# Patient Record
Sex: FEMALE | Race: WHITE | NOT HISPANIC OR LATINO
[De-identification: names, ages, dates, MRNs, and addresses within clinical notes are randomized per-mention and may not be internally consistent; named-entity substitution may affect disease eponyms.]

---

## 2017-01-31 ENCOUNTER — APPOINTMENT (OUTPATIENT)
Dept: THORACIC SURGERY | Facility: CLINIC | Age: 52
End: 2017-01-31

## 2017-01-31 VITALS
RESPIRATION RATE: 17 BRPM | BODY MASS INDEX: 50.02 KG/M2 | SYSTOLIC BLOOD PRESSURE: 127 MMHG | DIASTOLIC BLOOD PRESSURE: 61 MMHG | TEMPERATURE: 97.3 F | OXYGEN SATURATION: 95 % | HEART RATE: 57 BPM | WEIGHT: 293 LBS | HEIGHT: 64 IN

## 2017-01-31 DIAGNOSIS — E03.9 HYPOTHYROIDISM, UNSPECIFIED: ICD-10-CM

## 2017-01-31 DIAGNOSIS — Z78.9 OTHER SPECIFIED HEALTH STATUS: ICD-10-CM

## 2017-01-31 DIAGNOSIS — Z98.84 BARIATRIC SURGERY STATUS: ICD-10-CM

## 2017-01-31 DIAGNOSIS — K44.9 DIAPHRAGMATIC HERNIA W/OUT OBSTRUCTION OR GANGRENE: ICD-10-CM

## 2017-01-31 DIAGNOSIS — I10 ESSENTIAL (PRIMARY) HYPERTENSION: ICD-10-CM

## 2017-01-31 DIAGNOSIS — E28.2 POLYCYSTIC OVARIAN SYNDROME: ICD-10-CM

## 2017-01-31 DIAGNOSIS — F17.200 NICOTINE DEPENDENCE, UNSPECIFIED, UNCOMPLICATED: ICD-10-CM

## 2017-01-31 RX ORDER — LEVOTHYROXINE SODIUM 0.15 MG/1
150 TABLET ORAL
Refills: 0 | Status: ACTIVE | COMMUNITY

## 2017-01-31 RX ORDER — SIMVASTATIN 20 MG/1
20 TABLET, FILM COATED ORAL
Refills: 0 | Status: ACTIVE | COMMUNITY

## 2017-01-31 RX ORDER — PANTOPRAZOLE SODIUM 40 MG/1
40 TABLET, DELAYED RELEASE ORAL
Refills: 0 | Status: ACTIVE | COMMUNITY

## 2017-01-31 RX ORDER — FLUOXETINE HYDROCHLORIDE 90 MG/1
90 CAPSULE, DELAYED RELEASE PELLETS ORAL
Refills: 0 | Status: ACTIVE | COMMUNITY

## 2017-01-31 RX ORDER — METOPROLOL TARTRATE 25 MG/1
25 TABLET, FILM COATED ORAL
Refills: 0 | Status: ACTIVE | COMMUNITY

## 2017-01-31 RX ORDER — CELECOXIB 200 MG/1
200 CAPSULE ORAL
Refills: 0 | Status: ACTIVE | COMMUNITY

## 2017-01-31 RX ORDER — SUCRALFATE 1 G/10ML
1 SUSPENSION ORAL
Refills: 0 | Status: ACTIVE | COMMUNITY

## 2017-02-20 ENCOUNTER — OUTPATIENT (OUTPATIENT)
Dept: OUTPATIENT SERVICES | Facility: HOSPITAL | Age: 52
LOS: 1 days | Discharge: HOME | End: 2017-02-20

## 2017-03-02 ENCOUNTER — APPOINTMENT (OUTPATIENT)
Dept: ORTHOPEDIC SURGERY | Facility: CLINIC | Age: 52
End: 2017-03-02

## 2017-06-27 DIAGNOSIS — Z12.31 ENCOUNTER FOR SCREENING MAMMOGRAM FOR MALIGNANT NEOPLASM OF BREAST: ICD-10-CM

## 2018-03-20 ENCOUNTER — OUTPATIENT (OUTPATIENT)
Dept: OUTPATIENT SERVICES | Facility: HOSPITAL | Age: 53
LOS: 1 days | Discharge: HOME | End: 2018-03-20

## 2018-03-20 DIAGNOSIS — Z12.31 ENCOUNTER FOR SCREENING MAMMOGRAM FOR MALIGNANT NEOPLASM OF BREAST: ICD-10-CM

## 2018-11-25 ENCOUNTER — TRANSCRIPTION ENCOUNTER (OUTPATIENT)
Age: 53
End: 2018-11-25

## 2019-04-01 PROBLEM — E28.2 POLYCYSTIC OVARY SYNDROME: Status: ACTIVE | Noted: 2017-01-31

## 2019-08-19 ENCOUNTER — OUTPATIENT (OUTPATIENT)
Dept: OUTPATIENT SERVICES | Facility: HOSPITAL | Age: 54
LOS: 1 days | Discharge: HOME | End: 2019-08-19
Payer: MEDICARE

## 2019-08-19 DIAGNOSIS — Z12.31 ENCOUNTER FOR SCREENING MAMMOGRAM FOR MALIGNANT NEOPLASM OF BREAST: ICD-10-CM

## 2019-08-19 PROCEDURE — 77063 BREAST TOMOSYNTHESIS BI: CPT | Mod: 26

## 2019-08-19 PROCEDURE — 77067 SCR MAMMO BI INCL CAD: CPT | Mod: 26

## 2019-08-26 ENCOUNTER — OUTPATIENT (OUTPATIENT)
Dept: OUTPATIENT SERVICES | Facility: HOSPITAL | Age: 54
LOS: 1 days | Discharge: HOME | End: 2019-08-26
Payer: MEDICARE

## 2019-08-26 DIAGNOSIS — R92.8 OTHER ABNORMAL AND INCONCLUSIVE FINDINGS ON DIAGNOSTIC IMAGING OF BREAST: ICD-10-CM

## 2019-08-26 PROCEDURE — 77065 DX MAMMO INCL CAD UNI: CPT | Mod: 26,RT

## 2019-08-26 PROCEDURE — G0279: CPT | Mod: 26,RT

## 2019-08-26 PROCEDURE — 76642 ULTRASOUND BREAST LIMITED: CPT | Mod: 26,RT

## 2021-06-28 ENCOUNTER — OUTPATIENT (OUTPATIENT)
Dept: OUTPATIENT SERVICES | Facility: HOSPITAL | Age: 56
LOS: 1 days | Discharge: HOME | End: 2021-06-28
Payer: MEDICARE

## 2021-06-28 ENCOUNTER — RESULT REVIEW (OUTPATIENT)
Age: 56
End: 2021-06-28

## 2021-06-28 DIAGNOSIS — M17.0 BILATERAL PRIMARY OSTEOARTHRITIS OF KNEE: ICD-10-CM

## 2021-06-28 PROCEDURE — 73522 X-RAY EXAM HIPS BI 3-4 VIEWS: CPT | Mod: 26

## 2021-06-28 PROCEDURE — 73564 X-RAY EXAM KNEE 4 OR MORE: CPT | Mod: 26,50

## 2022-06-15 ENCOUNTER — RESULT REVIEW (OUTPATIENT)
Age: 57
End: 2022-06-15

## 2022-06-15 ENCOUNTER — OUTPATIENT (OUTPATIENT)
Dept: OUTPATIENT SERVICES | Facility: HOSPITAL | Age: 57
LOS: 1 days | Discharge: HOME | End: 2022-06-15
Payer: MEDICARE

## 2022-06-15 DIAGNOSIS — M17.0 BILATERAL PRIMARY OSTEOARTHRITIS OF KNEE: ICD-10-CM

## 2022-06-15 PROCEDURE — 73562 X-RAY EXAM OF KNEE 3: CPT | Mod: 26,50

## 2022-09-08 ENCOUNTER — RESULT REVIEW (OUTPATIENT)
Age: 57
End: 2022-09-08

## 2022-09-08 ENCOUNTER — OUTPATIENT (OUTPATIENT)
Dept: OUTPATIENT SERVICES | Facility: HOSPITAL | Age: 57
LOS: 1 days | Discharge: HOME | End: 2022-09-08

## 2022-09-08 DIAGNOSIS — M19.072 PRIMARY OSTEOARTHRITIS, LEFT ANKLE AND FOOT: ICD-10-CM

## 2022-09-08 PROCEDURE — 73610 X-RAY EXAM OF ANKLE: CPT | Mod: 26,50

## 2023-12-19 ENCOUNTER — APPOINTMENT (OUTPATIENT)
Dept: PLASTIC SURGERY | Facility: CLINIC | Age: 58
End: 2023-12-19
Payer: MEDICARE

## 2023-12-19 VITALS — WEIGHT: 197 LBS | BODY MASS INDEX: 34.91 KG/M2 | HEIGHT: 63 IN

## 2023-12-19 DIAGNOSIS — Z87.891 PERSONAL HISTORY OF NICOTINE DEPENDENCE: ICD-10-CM

## 2023-12-19 DIAGNOSIS — Z87.42 PERSONAL HISTORY OF OTHER DISEASES OF THE FEMALE GENITAL TRACT: ICD-10-CM

## 2023-12-19 DIAGNOSIS — Z87.19 PERSONAL HISTORY OF OTHER DISEASES OF THE DIGESTIVE SYSTEM: ICD-10-CM

## 2023-12-19 DIAGNOSIS — F41.9 ANXIETY DISORDER, UNSPECIFIED: ICD-10-CM

## 2023-12-19 DIAGNOSIS — Z86.39 PERSONAL HISTORY OF OTHER ENDOCRINE, NUTRITIONAL AND METABOLIC DISEASE: ICD-10-CM

## 2023-12-19 DIAGNOSIS — G89.29 DORSALGIA, UNSPECIFIED: ICD-10-CM

## 2023-12-19 DIAGNOSIS — Z87.39 PERSONAL HISTORY OF OTHER DISEASES OF THE MUSCULOSKELETAL SYSTEM AND CONNECTIVE TISSUE: ICD-10-CM

## 2023-12-19 DIAGNOSIS — M54.9 DORSALGIA, UNSPECIFIED: ICD-10-CM

## 2023-12-19 DIAGNOSIS — K42.9 UMBILICAL HERNIA W/OUT OBSTRUCTION OR GANGRENE: ICD-10-CM

## 2023-12-19 DIAGNOSIS — F32.A ANXIETY DISORDER, UNSPECIFIED: ICD-10-CM

## 2023-12-19 DIAGNOSIS — Z86.79 PERSONAL HISTORY OF OTHER DISEASES OF THE CIRCULATORY SYSTEM: ICD-10-CM

## 2023-12-19 PROCEDURE — 99203 OFFICE O/P NEW LOW 30 MIN: CPT

## 2023-12-19 RX ORDER — CLOBETASOL PROPIONATE 0.5 MG/G
CREAM TOPICAL
Refills: 0 | Status: ACTIVE | COMMUNITY

## 2023-12-19 RX ORDER — ACETAMINOPHEN/DIPHENHYDRAMINE 500MG-25MG
1000 TABLET ORAL
Refills: 0 | Status: ACTIVE | COMMUNITY

## 2023-12-19 RX ORDER — MULTIVIT-MIN/IRON/FOLIC ACID/K 18-600-40
CAPSULE ORAL
Refills: 0 | Status: ACTIVE | COMMUNITY

## 2023-12-19 RX ORDER — MULTIVITAMIN
TABLET ORAL
Refills: 0 | Status: ACTIVE | COMMUNITY

## 2023-12-19 RX ORDER — MECLIZINE HYDROCHLORIDE 25 MG/1
TABLET ORAL
Refills: 0 | Status: ACTIVE | COMMUNITY

## 2023-12-19 NOTE — PHYSICAL EXAM
[de-identified] : Well developed, overweight female in NAD [de-identified] : ATNC [de-identified] : Supple [de-identified] : Non labored on RA [de-identified] : LORIR [de-identified] : Bilateral breasts soft, with significant Grade 4 ptosis.  BSA 1.92, pt will require an approx 530gm reduction bilaterally [de-identified] : Abd is soft, non-tender with questionable palpable umbilical hernia. Large, grade 4 overhanging abdominal pannus with erythematous under pannus  irritation and significant moisture.  [de-identified] : FROM

## 2023-12-19 NOTE — DATA REVIEWED
[FreeTextEntry1] : Most recent mammogram along with all supporting documents have been uploaded and reviewed

## 2023-12-19 NOTE — ASSESSMENT
[FreeTextEntry1] : Patient is a good candidate for a panniculectomy.  Regarding the procedure, we discussed the risks of poor wound healing, seroma formation, infection, bleeding, keloid/hypertrophic scarring, dissatisfaction with the outcome, need for readmission, venous thromboembolic complications with possible pulmonary embolism.  We discussed the need for postop use of an abdominal binder and the limited activity after surgery. All the patient's questions were answered and all risks were well understood by the patient.  Would need emmanuel castaneda  Patient is a good candidate for breast reduction.  Regarding the procedure, we discussed scarring, poor wound healing, keloid and/or hypertrophic scarring, diminished nipple sensation, diminished ability to breast feed (if appropriate), breast asymmetry, dissatisfaction with the outcome, need for additional surgery and possible nipple loss.  All questions were answered and all risks were understood.  Would need free nipple graft  Photos taken with patient permission. Abd CT  pt wishes to have abdoment addressed first

## 2023-12-19 NOTE — REVIEW OF SYSTEMS
[As Noted in HPI] : as noted in HPI [Difficulty Walking] : difficulty walking [Anxiety] : anxiety [Depression] : depression [Negative] : Heme/Lymph [Chest Pain] : no chest pain [Shortness Of Breath] : no shortness of breath [Abdominal Pain] : no abdominal pain [Vomiting] : no vomiting

## 2023-12-19 NOTE — HISTORY OF PRESENT ILLNESS
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 Patient reports history of Lap Band in 2009 with Dr. Adam in [facility], s/p band revision & hernia repair (no mesh) in 2017 and then conversion to gastric bypass in March 2022 with . Pt states nausea/vomiting resolved. Pt still follows up with  office, was started on Ozempic 1 year ago which prompted an additional 60lbs. Pt with overall 150lbs weight loss (348lbs-->196lbs). Weight stable within 5-10lbs for approximately 3 months, BMI 34.9. Pt had nutritional labs done recently, does not have a copy, unsure of any deficiencies. Pt endorses h/o severe rashes and irritation under both her breast and her abd pannus, pt has used anti-fungal powder and OTC ointments for over 6 months without improvement. Pt has severe bra strap grooving as well.  Overhanging abdominal pannus affects low self esteem and daily interactions. Pt states that she has tremendous difficulty walking, to the point she has felt bed bound at times.  States her pannnus has affected her ability to get in and out of the shower, making cleanliness difficult. Patient reports all over back pain secondary to weight of pannus, pt received her 1st spinal shot by  (in [place]) last week, without any relief yet. Pt also went to physical therapy for over 6 months without any relief.   Pt was on high dose pain medication (fentanyl, oxycodone, more)  however  stopped all of them and plans on prescribing 1 narcotic to decrease r/o dependency.  Pt is due for a mammo, her last one 1 year ago was WNL. Denies any personal or family h/o breast cancer.   Desires surgical correction for both her breasts and abdominal pannus.  No other pertinent medical or surgical history.   Pt has supporting letters from multiple doctors recommended panniculectomy/ breast reduction surgery. Ortho states pain is NOT 2/2 spinal issues, derm noted h/o chronic rashes since 2017, etc.  Current bra size: 42F Desires size: as small as possible   Former smoker, quit 10 years ago. Smokes sometimes,  last cigarette was 5M ago.  Occ: On disability x10 years for chronic pain issues  Previously saw  however very unhappy

## 2023-12-22 ENCOUNTER — NON-APPOINTMENT (OUTPATIENT)
Age: 58
End: 2023-12-22

## 2023-12-24 ENCOUNTER — OUTPATIENT (OUTPATIENT)
Dept: OUTPATIENT SERVICES | Facility: HOSPITAL | Age: 58
LOS: 1 days | End: 2023-12-24
Payer: MEDICARE

## 2023-12-24 ENCOUNTER — RESULT REVIEW (OUTPATIENT)
Age: 58
End: 2023-12-24

## 2023-12-24 DIAGNOSIS — Z00.8 ENCOUNTER FOR OTHER GENERAL EXAMINATION: ICD-10-CM

## 2023-12-24 DIAGNOSIS — K42.9 UMBILICAL HERNIA WITHOUT OBSTRUCTION OR GANGRENE: ICD-10-CM

## 2023-12-24 PROCEDURE — 74176 CT ABD & PELVIS W/O CONTRAST: CPT | Mod: 26,MH

## 2023-12-24 PROCEDURE — 74176 CT ABD & PELVIS W/O CONTRAST: CPT

## 2023-12-25 DIAGNOSIS — K42.9 UMBILICAL HERNIA WITHOUT OBSTRUCTION OR GANGRENE: ICD-10-CM

## 2024-01-30 ENCOUNTER — APPOINTMENT (OUTPATIENT)
Dept: PLASTIC SURGERY | Facility: CLINIC | Age: 59
End: 2024-01-30
Payer: MEDICARE

## 2024-01-30 ENCOUNTER — TRANSCRIPTION ENCOUNTER (OUTPATIENT)
Age: 59
End: 2024-01-30

## 2024-01-30 PROCEDURE — 99212 OFFICE O/P EST SF 10 MIN: CPT

## 2024-01-30 NOTE — ASSESSMENT
[FreeTextEntry1] : Patient is a good candidate for a panniculectomy.  Regarding the procedure, we discussed the risks of poor wound healing, seroma formation, infection, bleeding, keloid/hypertrophic scarring, dissatisfaction with the outcome, need for readmission, venous thromboembolic complications with possible pulmonary embolism.  We discussed the need for postop use of an abdominal binder and the limited activity after surgery. All the patient's questions were answered and all risks were well understood by the patient.  Would need emmanuel leon panskye  Patient is a good candidate for breast reduction.  Regarding the procedure, we discussed scarring, poor wound healing, keloid and/or hypertrophic scarring, diminished nipple sensation, diminished ability to breast feed (if appropriate), breast asymmetry, dissatisfaction with the outcome, need for additional surgery and possible nipple loss.  All questions were answered and all risks were understood.  Would need free nipple graft  Photos taken with patient permission. Abd CT  pt wishes to have abdoment addressed first  1/30/2024 as above CT reviewed  preop ?s reviewed in detail  reasonmable results discussed  Patient is a good candidate for a panniculectomy.  Regarding the procedure, we discussed the risks of poor wound healing, seroma formation, infection, bleeding, keloid/hypertrophic scarring, dissatisfaction with the outcome, need for readmission, venous thromboembolic complications with possible pulmonary embolism.  We discussed the need for postop use of an abdominal binder and the limited activity after surgery. All the patient's questions were answered and all risks were well understood by the patient.  CT reviewed  OR Feb 26  pt happy w plan  discussed risk odf umbliical loss

## 2024-01-30 NOTE — REVIEW OF SYSTEMS
[Chest Pain] : no chest pain [Shortness Of Breath] : no shortness of breath [Abdominal Pain] : no abdominal pain [Vomiting] : no vomiting [As Noted in HPI] : as noted in HPI [Difficulty Walking] : difficulty walking [Anxiety] : anxiety [Depression] : depression [Negative] : Heme/Lymph

## 2024-01-30 NOTE — HISTORY OF PRESENT ILLNESS
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 Patient reports history of Lap Band in 2009 with Dr. Adam in [facility], s/p band revision & hernia repair (no mesh) in 2017 and then conversion to gastric bypass in March 2022 with . Pt states nausea/vomiting resolved. Pt still follows up with  office, was started on Ozempic 1 year ago which prompted an additional 60lbs. Pt with overall 150lbs weight loss (348lbs-->196lbs). Weight stable within 5-10lbs for approximately 3 months, BMI 34.9. Pt had nutritional labs done recently, does not have a copy, unsure of any deficiencies. Pt endorses h/o severe rashes and irritation under both her breast and her abd pannus, pt has used anti-fungal powder and OTC ointments for over 6 months without improvement. Pt has severe bra strap grooving as well.  Overhanging abdominal pannus affects low self esteem and daily interactions. Pt states that she has tremendous difficulty walking, to the point she has felt bed bound at times.  States her pannnus has affected her ability to get in and out of the shower, making cleanliness difficult. Patient reports all over back pain secondary to weight of pannus, pt received her 1st spinal shot by  (in [place]) last week, without any relief yet. Pt also went to physical therapy for over 6 months without any relief.   Pt was on high dose pain medication (fentanyl, oxycodone, more)  however  stopped all of them and plans on prescribing 1 narcotic to decrease r/o dependency.  Pt is due for a mammo, her last one 1 year ago was WNL. Denies any personal or family h/o breast cancer.   Desires surgical correction for both her breasts and abdominal pannus.  No other pertinent medical or surgical history.   Pt has supporting letters from multiple doctors recommended panniculectomy/ breast reduction surgery. Ortho states pain is NOT 2/2 spinal issues, derm noted h/o chronic rashes since 2017, etc.  Current bra size: 42F Desires size: as small as possible   Former smoker, quit 10 years ago. Smokes sometimes,  last cigarette was 5M ago.  Occ: On disability x10 years for chronic pain issues  Previously saw  however very unhappy

## 2024-01-30 NOTE — PHYSICAL EXAM
[de-identified] : Well developed, overweight female in NAD [de-identified] : ATNC [de-identified] : Supple [de-identified] : Non labored on RA [de-identified] : LORIR [de-identified] : Bilateral breasts soft, with significant Grade 4 ptosis.  BSA 1.92, pt will require an approx 530gm reduction bilaterally [de-identified] : Abd is soft, non-tender with questionable palpable umbilical hernia. Large, grade 4 overhanging abdominal pannus with erythematous under pannus  irritation and significant moisture.  [de-identified] : FROM

## 2024-02-09 DIAGNOSIS — G89.18 OTHER ACUTE POSTPROCEDURAL PAIN: ICD-10-CM

## 2024-02-12 ENCOUNTER — OUTPATIENT (OUTPATIENT)
Dept: OUTPATIENT SERVICES | Facility: HOSPITAL | Age: 59
LOS: 1 days | End: 2024-02-12
Payer: MEDICARE

## 2024-02-12 VITALS
SYSTOLIC BLOOD PRESSURE: 137 MMHG | HEART RATE: 61 BPM | RESPIRATION RATE: 16 BRPM | DIASTOLIC BLOOD PRESSURE: 73 MMHG | WEIGHT: 192.02 LBS | TEMPERATURE: 98 F | HEIGHT: 62 IN | OXYGEN SATURATION: 99 %

## 2024-02-12 DIAGNOSIS — Z98.891 HISTORY OF UTERINE SCAR FROM PREVIOUS SURGERY: Chronic | ICD-10-CM

## 2024-02-12 DIAGNOSIS — Z98.890 OTHER SPECIFIED POSTPROCEDURAL STATES: Chronic | ICD-10-CM

## 2024-02-12 DIAGNOSIS — Z90.89 ACQUIRED ABSENCE OF OTHER ORGANS: Chronic | ICD-10-CM

## 2024-02-12 DIAGNOSIS — Z87.19 PERSONAL HISTORY OF OTHER DISEASES OF THE DIGESTIVE SYSTEM: Chronic | ICD-10-CM

## 2024-02-12 DIAGNOSIS — L98.7 EXCESSIVE AND REDUNDANT SKIN AND SUBCUTANEOUS TISSUE: ICD-10-CM

## 2024-02-12 DIAGNOSIS — Z86.018 PERSONAL HISTORY OF OTHER BENIGN NEOPLASM: Chronic | ICD-10-CM

## 2024-02-12 DIAGNOSIS — Z01.818 ENCOUNTER FOR OTHER PREPROCEDURAL EXAMINATION: ICD-10-CM

## 2024-02-12 LAB
ALBUMIN SERPL ELPH-MCNC: 3.9 G/DL — SIGNIFICANT CHANGE UP (ref 3.5–5.2)
ALP SERPL-CCNC: 111 U/L — SIGNIFICANT CHANGE UP (ref 30–115)
ALT FLD-CCNC: 13 U/L — SIGNIFICANT CHANGE UP (ref 0–41)
ANION GAP SERPL CALC-SCNC: 8 MMOL/L — SIGNIFICANT CHANGE UP (ref 7–14)
AST SERPL-CCNC: 14 U/L — SIGNIFICANT CHANGE UP (ref 0–41)
BASOPHILS # BLD AUTO: 0.04 K/UL — SIGNIFICANT CHANGE UP (ref 0–0.2)
BASOPHILS NFR BLD AUTO: 1.2 % — HIGH (ref 0–1)
BILIRUB SERPL-MCNC: 0.4 MG/DL — SIGNIFICANT CHANGE UP (ref 0.2–1.2)
BUN SERPL-MCNC: 15 MG/DL — SIGNIFICANT CHANGE UP (ref 10–20)
CALCIUM SERPL-MCNC: 8.8 MG/DL — SIGNIFICANT CHANGE UP (ref 8.4–10.5)
CHLORIDE SERPL-SCNC: 106 MMOL/L — SIGNIFICANT CHANGE UP (ref 98–110)
CO2 SERPL-SCNC: 27 MMOL/L — SIGNIFICANT CHANGE UP (ref 17–32)
CREAT SERPL-MCNC: 0.9 MG/DL — SIGNIFICANT CHANGE UP (ref 0.7–1.5)
EGFR: 73 ML/MIN/1.73M2 — SIGNIFICANT CHANGE UP
EOSINOPHIL # BLD AUTO: 0.1 K/UL — SIGNIFICANT CHANGE UP (ref 0–0.7)
EOSINOPHIL NFR BLD AUTO: 3.1 % — SIGNIFICANT CHANGE UP (ref 0–8)
GLUCOSE SERPL-MCNC: 72 MG/DL — SIGNIFICANT CHANGE UP (ref 70–99)
HCT VFR BLD CALC: 35.6 % — LOW (ref 37–47)
HGB BLD-MCNC: 12 G/DL — SIGNIFICANT CHANGE UP (ref 12–16)
IMM GRANULOCYTES NFR BLD AUTO: 0.3 % — SIGNIFICANT CHANGE UP (ref 0.1–0.3)
LYMPHOCYTES # BLD AUTO: 1.19 K/UL — LOW (ref 1.2–3.4)
LYMPHOCYTES # BLD AUTO: 37 % — SIGNIFICANT CHANGE UP (ref 20.5–51.1)
MCHC RBC-ENTMCNC: 29.8 PG — SIGNIFICANT CHANGE UP (ref 27–31)
MCHC RBC-ENTMCNC: 33.7 G/DL — SIGNIFICANT CHANGE UP (ref 32–37)
MCV RBC AUTO: 88.3 FL — SIGNIFICANT CHANGE UP (ref 81–99)
MONOCYTES # BLD AUTO: 0.26 K/UL — SIGNIFICANT CHANGE UP (ref 0.1–0.6)
MONOCYTES NFR BLD AUTO: 8.1 % — SIGNIFICANT CHANGE UP (ref 1.7–9.3)
NEUTROPHILS # BLD AUTO: 1.62 K/UL — SIGNIFICANT CHANGE UP (ref 1.4–6.5)
NEUTROPHILS NFR BLD AUTO: 50.3 % — SIGNIFICANT CHANGE UP (ref 42.2–75.2)
NRBC # BLD: 0 /100 WBCS — SIGNIFICANT CHANGE UP (ref 0–0)
PLATELET # BLD AUTO: 226 K/UL — SIGNIFICANT CHANGE UP (ref 130–400)
PMV BLD: 9.9 FL — SIGNIFICANT CHANGE UP (ref 7.4–10.4)
POTASSIUM SERPL-MCNC: 4.2 MMOL/L — SIGNIFICANT CHANGE UP (ref 3.5–5)
POTASSIUM SERPL-SCNC: 4.2 MMOL/L — SIGNIFICANT CHANGE UP (ref 3.5–5)
PROT SERPL-MCNC: 6.2 G/DL — SIGNIFICANT CHANGE UP (ref 6–8)
RBC # BLD: 4.03 M/UL — LOW (ref 4.2–5.4)
RBC # FLD: 12.9 % — SIGNIFICANT CHANGE UP (ref 11.5–14.5)
SODIUM SERPL-SCNC: 141 MMOL/L — SIGNIFICANT CHANGE UP (ref 135–146)
WBC # BLD: 3.22 K/UL — LOW (ref 4.8–10.8)
WBC # FLD AUTO: 3.22 K/UL — LOW (ref 4.8–10.8)

## 2024-02-12 PROCEDURE — 93005 ELECTROCARDIOGRAM TRACING: CPT

## 2024-02-12 PROCEDURE — 99214 OFFICE O/P EST MOD 30 MIN: CPT | Mod: 25

## 2024-02-12 PROCEDURE — 93010 ELECTROCARDIOGRAM REPORT: CPT

## 2024-02-12 PROCEDURE — 85025 COMPLETE CBC W/AUTO DIFF WBC: CPT

## 2024-02-12 PROCEDURE — 80053 COMPREHEN METABOLIC PANEL: CPT

## 2024-02-12 PROCEDURE — 36415 COLL VENOUS BLD VENIPUNCTURE: CPT

## 2024-02-12 NOTE — H&P PST ADULT - NSICDXPASTMEDICALHX_GEN_ALL_CORE_FT
PAST MEDICAL HISTORY:  Anxiety and depression     Chronic fatigue     H/O fibromyalgia     History of seizure disorder     Hypertension     Hypothyroidism

## 2024-02-12 NOTE — H&P PST ADULT - NSICDXFAMILYHX_GEN_ALL_CORE_FT
FAMILY HISTORY:  Father  Still living? Unknown  FHx: early MI, Age at diagnosis: 41-50    Mother  Still living? Unknown  FHx: stomach cancer, Age at diagnosis: Age Unknown

## 2024-02-12 NOTE — H&P PST ADULT - HISTORY OF PRESENT ILLNESS
57 y/o female presents to PAST in preparation for Sury Freire Panniculectomy in [facility] under [place] with Dr. Gilliland on 2/26/24    Pt states that she has lost 140 lbs in the past 2 yrs, with gastric bypass and Ozempic (last use 1 month ago). Pt now with excessive loose abdominal skin, causes rashes, back pain. Pt has tried different creams for rashes with minimal result. Pt now for above procedure.     PATIENT CURRENTLY DENIES CHEST PAIN, PALPITATIONS,  DYSURIA, OR UPPER RESPIRATORY INFECTION IN PAST 2 WEEKS  Patient verbalized understanding of instructions and was given the opportunity to ask questions and have them answered.  As per patient, this is their complete medical and surgical history, including medications both prescribed or over the counter.  written and verbal instructions with teach back on chlorhexidine shampoo provided,  pt verbalized understanding with returned demonstration    Anesthesia Alert  NO--Difficult Airway  NO--History of neck surgery or radiation  NO--Limited ROM of neck  NO--History of Malignant hyperthermia  NO--Personal or family history of Pseudocholinesterase deficiency.  NO--Prior Anesthesia Complication  NO--Latex Allergy  NO--Loose teeth  NO--History of Rheumatoid Arthritis  NO--VY-had prior to weight loss   NO--Bleeding risk  NO--Other_____  Mallampati airway: Class II  Hx of Seizure- last sx many yrs ago     [facility] Activity Status Index (DASI) from [employer]  on 2/12/2024      RESULT SUMMARY:  18.95 points  The higher the score (maximum 58.2), the higher the functional status.    5.07 METs        INPUTS:  Take care of self —> 2.75 = Yes  Walk indoors —> 1.75 = Yes  Walk 1&ndash;2 blocks on level ground —> 2.75 = Yes  Climb a flight of stairs or walk up a hill —> 5.5 = Yes  Run a short distance —> 0 = No  Do light work around the house —> 2.7 = Yes  Do moderate work around the house —> 3.5 = Yes  Do heavy work around the house —> 0 = No  Do yardwork —> 0 = No  Have sexual relations —> 0 = No  Participate in moderate recreational activities —> 0 = No  Participate in strenuous sports —> 0 = No    Revised Cardiac Risk Index for Pre-Operative Risk from [employer]  on 2/12/2024      RESULT SUMMARY:  0 points  Class I Risk    3.9 %  30-day risk of death, MI, or cardiac arrest    INPUTS:  Elevated-risk surgery —> 0 = No  History of ischemic heart disease —> 0 = No  History of congestive heart failure —> 0 = No  History of cerebrovascular disease —> 0 = No  Pre-operative treatment with insulin —> 0 = No  Pre-operative creatinine >2 mg/dL / 176.8 µmol/L —> 0 = No      Excessive or redundant skin or subcutaneous tissue    Encounter for other preprocedural examination    [id number]; [id number]; [id number]    [email or web address]

## 2024-02-12 NOTE — H&P PST ADULT - NSICDXPASTSURGICALHX_GEN_ALL_CORE_FT
PAST SURGICAL HISTORY:  H/O hiatal hernia     H/O:      History of gastric surgery     History of lipoma     History of tonsillectomy

## 2024-02-12 NOTE — H&P PST ADULT - REASON FOR ADMISSION
59 y/o female presents to PAST in preparation for Sury Freire Panniculectomy in [facility] under [place] with Dr. Gilliland on 2/26/24

## 2024-02-13 DIAGNOSIS — L98.7 EXCESSIVE AND REDUNDANT SKIN AND SUBCUTANEOUS TISSUE: ICD-10-CM

## 2024-02-13 DIAGNOSIS — Z01.818 ENCOUNTER FOR OTHER PREPROCEDURAL EXAMINATION: ICD-10-CM

## 2024-02-15 ENCOUNTER — APPOINTMENT (OUTPATIENT)
Dept: PLASTIC SURGERY | Facility: CLINIC | Age: 59
End: 2024-02-15
Payer: MEDICARE

## 2024-02-15 DIAGNOSIS — Z98.890 NAUSEA WITH VOMITING, UNSPECIFIED: ICD-10-CM

## 2024-02-15 DIAGNOSIS — R11.2 NAUSEA WITH VOMITING, UNSPECIFIED: ICD-10-CM

## 2024-02-15 PROCEDURE — 99212 OFFICE O/P EST SF 10 MIN: CPT

## 2024-02-15 RX ORDER — TRAMADOL HYDROCHLORIDE 50 MG/1
50 TABLET, COATED ORAL EVERY 8 HOURS
Qty: 12 | Refills: 0 | Status: ACTIVE | COMMUNITY
Start: 2024-02-15 | End: 1900-01-01

## 2024-02-15 RX ORDER — CEPHALEXIN 500 MG/1
500 CAPSULE ORAL
Qty: 14 | Refills: 0 | Status: ACTIVE | COMMUNITY
Start: 2024-02-15 | End: 1900-01-01

## 2024-02-15 NOTE — PHYSICAL EXAM
[de-identified] : Abdomen: Abd is soft,. Large, grade 4 overhanging abdominal pannus with erythematous under pannus irritation and significant moisture

## 2024-02-15 NOTE — HISTORY OF PRESENT ILLNESS
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 Patient reports history of Lap Band in 2009 with Dr. Adam in [facility], s/p band revision & hernia repair (no mesh) in 2017 and then conversion to gastric bypass in March 2022 with . Pt states nausea/vomiting resolved. Pt still follows up with  office, was started on Ozempic 1 year ago which prompted an additional 60lbs. Pt with overall 150lbs weight loss (348lbs-->196lbs). Weight stable within 5-10lbs for approximately 3 months, BMI 34.9. Pt had nutritional labs done recently, does not have a copy, unsure of any deficiencies. Pt endorses h/o severe rashes and irritation under both her breast and her abd pannus, pt has used anti-fungal powder and OTC ointments for over 6 months without improvement. Pt has severe bra strap grooving as well. Overhanging abdominal pannus affects low self esteem and daily interactions. Pt states that she has tremendous difficulty walking, to the point she has felt bed bound at times. States her pannnus has affected her ability to get in and out of the shower, making cleanliness difficult. Patient reports all over back pain secondary to weight of pannus, pt received her 1st spinal shot by  (in [place]) last week, without any relief yet. Pt also went to physical therapy for over 6 months without any relief.  Pt was on high dose pain medication (fentanyl, oxycodone, more) however  stopped all of them and plans on prescribing 1 narcotic to decrease r/o dependency. Pt is due for a mammo, her last one 1 year ago was WNL. Denies any personal or family h/o breast cancer.  Desires surgical correction for both her breasts and abdominal pannus. No other pertinent medical or surgical history.  Pt has supporting letters from multiple doctors recommended panniculectomy/ breast reduction surgery. Ortho states pain is NOT 2/2 spinal issues, derm noted h/o chronic rashes since 2017, etc. Current bra size: 42F Desires size: as small as possible  interval hx 2/15/24: here for questions prior to surgery on 2/26, concerned with area of mons  Former smoker, quit 10 years ago. Smokes sometimes, last cigarette was 5M ago. Occ: On disability x10 years for chronic pain issues Previously saw  however very unhappy

## 2024-02-15 NOTE — ASSESSMENT
[FreeTextEntry1] : Patient is a good candidate for a panniculectomy. Regarding the procedure, we discussed the risks of poor wound healing, seroma formation, infection, bleeding, keloid/hypertrophic scarring, dissatisfaction with the outcome, need for readmission, venous thromboembolic complications with possible pulmonary embolism. We discussed the need for postop use of an abdominal binder and the limited activity after surgery. All the patient's questions were answered and all risks were well understood by the patient.  Would need emmanuel di lis pannic  Patient is a good candidate for breast reduction. Regarding the procedure, we discussed scarring, poor wound healing, keloid and/or hypertrophic scarring, diminished nipple sensation, diminished ability to breast feed (if appropriate), breast asymmetry, dissatisfaction with the outcome, need for additional surgery and possible nipple loss. All questions were answered and all risks were understood.  Would need free nipple graft  Photos taken with patient permission. Abd CT  pt wishes to have abdoment addressed first  1/30/2024 as above CT reviewed  preop ?s reviewed in detail  reasonmable results discussed  Patient is a good candidate for a panniculectomy. Regarding the procedure, we discussed the risks of poor wound healing, seroma formation, infection, bleeding, keloid/hypertrophic scarring, dissatisfaction with the outcome, need for readmission, venous thromboembolic complications with possible pulmonary embolism. We discussed the need for postop use of an abdominal binder and the limited activity after surgery. All the patient's questions were answered and all risks were well understood by the patient.  CT reviewed  OR Feb 26 discussed risk odf umbliical loss.   2/15/24 here for questions prior to panic 2/26 all questions reviewed and answered results and expectations discussed    Regarding the procedure, we discussed the risks of poor wound healing, seroma formation, infection, bleeding, keloid/hypertrophic scarring, dissatisfaction with the outcome, need for readmission, venous thromboembolic complications with possible pulmonary embolism. We discussed the need for postop use of an abdominal binder and the limited activity after surgery. All the patient's questions were answered and all risks were well understood by the patient.  list preop ?s discussed  pt showed me various pictures from internet--I advised this can increase preop/postop anxiety as most of pictures did not apply to her  emmanuel-di lis pannic  preop meds given at her request

## 2024-02-22 ENCOUNTER — APPOINTMENT (OUTPATIENT)
Dept: NEUROLOGY | Facility: CLINIC | Age: 59
End: 2024-02-22
Payer: MEDICARE

## 2024-02-22 VITALS
HEART RATE: 59 BPM | WEIGHT: 189 LBS | OXYGEN SATURATION: 98 % | SYSTOLIC BLOOD PRESSURE: 123 MMHG | HEIGHT: 62 IN | BODY MASS INDEX: 34.78 KG/M2 | DIASTOLIC BLOOD PRESSURE: 86 MMHG | RESPIRATION RATE: 16 BRPM

## 2024-02-22 DIAGNOSIS — Z86.39 PERSONAL HISTORY OF OTHER ENDOCRINE, NUTRITIONAL AND METABOLIC DISEASE: ICD-10-CM

## 2024-02-22 DIAGNOSIS — G93.32 MYALGIC ENCEPHALOMYELITIS/CHRONIC FATIGUE SYNDROME: ICD-10-CM

## 2024-02-22 DIAGNOSIS — G40.109 LOCALIZATION-RELATED (FOCAL) (PARTIAL) SYMPTOMATIC EPILEPSY AND EPILEPTIC SYNDROMES WITH SIMPLE PARTIAL SEIZURES, NOT INTRACTABLE, W/OUT STATUS EPILEPTICUS: ICD-10-CM

## 2024-02-22 DIAGNOSIS — G47.30 SLEEP APNEA, UNSPECIFIED: ICD-10-CM

## 2024-02-22 PROBLEM — Z87.39 PERSONAL HISTORY OF OTHER DISEASES OF THE MUSCULOSKELETAL SYSTEM AND CONNECTIVE TISSUE: Chronic | Status: ACTIVE | Noted: 2024-02-12

## 2024-02-22 PROBLEM — E03.9 HYPOTHYROIDISM, UNSPECIFIED: Chronic | Status: ACTIVE | Noted: 2024-02-12

## 2024-02-22 PROBLEM — F41.9 ANXIETY DISORDER, UNSPECIFIED: Chronic | Status: ACTIVE | Noted: 2024-02-12

## 2024-02-22 PROBLEM — R53.82 CHRONIC FATIGUE, UNSPECIFIED: Chronic | Status: ACTIVE | Noted: 2024-02-12

## 2024-02-22 PROCEDURE — 99204 OFFICE O/P NEW MOD 45 MIN: CPT

## 2024-02-22 PROCEDURE — 99214 OFFICE O/P EST MOD 30 MIN: CPT

## 2024-02-22 RX ORDER — RANITIDINE 150 MG/1
150 TABLET ORAL
Refills: 0 | Status: DISCONTINUED | COMMUNITY
End: 2024-02-22

## 2024-02-22 RX ORDER — TOPIRAMATE 200 MG/1
200 TABLET ORAL
Qty: 90 | Refills: 3 | Status: ACTIVE | COMMUNITY
Start: 2024-02-22 | End: 1900-01-01

## 2024-02-22 RX ORDER — LAMOTRIGINE 200 MG/1
200 TABLET ORAL
Qty: 270 | Refills: 3 | Status: ACTIVE | COMMUNITY
Start: 2024-02-22 | End: 1900-01-01

## 2024-02-22 RX ORDER — GABAPENTIN 300 MG/1
300 CAPSULE ORAL
Qty: 7 | Refills: 0 | Status: DISCONTINUED | COMMUNITY
Start: 2024-02-09 | End: 2024-02-22

## 2024-02-22 RX ORDER — SEMAGLUTIDE 1.34 MG/ML
INJECTION, SOLUTION SUBCUTANEOUS
Refills: 0 | Status: DISCONTINUED | COMMUNITY
End: 2024-02-22

## 2024-02-22 RX ORDER — LAMOTRIGINE 100 MG/1
100 TABLET ORAL
Refills: 0 | Status: DISCONTINUED | COMMUNITY
End: 2024-02-22

## 2024-02-22 RX ORDER — ACETAMINOPHEN 325 MG
TABLET ORAL
Refills: 0 | Status: DISCONTINUED | COMMUNITY
End: 2024-02-22

## 2024-02-22 RX ORDER — TOPIRAMATE 100 MG/1
100 TABLET, FILM COATED ORAL
Refills: 0 | Status: DISCONTINUED | COMMUNITY
End: 2024-02-22

## 2024-02-22 RX ORDER — NYSTATIN 100000 [USP'U]/G
POWDER TOPICAL
Refills: 0 | Status: DISCONTINUED | COMMUNITY
End: 2024-02-22

## 2024-02-22 RX ORDER — MULTIVIT-MIN/FOLIC/VIT K/LYCOP 400-300MCG
TABLET ORAL
Refills: 0 | Status: DISCONTINUED | COMMUNITY
End: 2024-02-22

## 2024-02-22 RX ORDER — GABAPENTIN 300 MG/1
300 CAPSULE ORAL
Qty: 7 | Refills: 0 | Status: DISCONTINUED | COMMUNITY
Start: 2024-02-15 | End: 2024-02-22

## 2024-02-22 RX ORDER — ONDANSETRON 8 MG/1
8 TABLET ORAL
Qty: 9 | Refills: 0 | Status: DISCONTINUED | COMMUNITY
Start: 2024-02-15 | End: 2024-02-22

## 2024-02-22 RX ORDER — ASPIRIN 81 MG/1
81 TABLET, CHEWABLE ORAL
Refills: 0 | Status: DISCONTINUED | COMMUNITY
End: 2024-02-22

## 2024-02-22 RX ORDER — BETAMETHASONE DIPROPIONATE 0.5 MG/G
0.05 CREAM, AUGMENTED TOPICAL
Refills: 0 | Status: DISCONTINUED | COMMUNITY
End: 2024-02-22

## 2024-02-22 NOTE — DISCUSSION/SUMMARY
[FreeTextEntry1] : Review reports of her cognitive testing last year. Continue same dosage of lamotrigine and topiramate. Change lamotrigine to BID to enhance compliance. Check levels and EEG prior to follow up.

## 2024-02-22 NOTE — PHYSICAL EXAM
[FreeTextEntry1] : General Appearance - Well groomed, Not in acute distress. Build & Nutrition - Well nourished.  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 At least 2 lipomas over the right medial forearm region.  Head and Neck Head - normocephalic, atraumatic with no lesions or palpable masses.  Neurologic Mental Status Affect - normal. Speech - Normal. Thought content/perception - Normal. Cognitive function - Normal. Cranial Nerves I Olfactory - Normal. IV Trochlear - Bilateral - Normal - Bilateral. VI Abducens - Bilateral - Normal - Bilateral. VIII Acoustic - Bilateral - Hearing normal - Bilateral. XII Hypoglossal - Bilateral - Normal - Bilateral. II Optic - Visual fields - Normal. III Oculomotor - Normal Bilaterally. V Trigeminal - Normal Bilaterally. VII Facial - Normal Bilaterally. IX Glossopharyngeal / X Vagus - Normal. XI Accessory - Normal Bilaterally. Sensory Light Touch - Decreased: Note: following distal right ulnar nerve distribution. Proprioception - Bilateral - Normal - Bilateral. Decreased: Note: same as light touch. Temperature: Decreased: Note: same as light touch. Vibration - Intact - Globally. Motor Bulk and Contour - Bulk and Contour - Atrophic - Note: mild in right FDI. Tone - Normal. Strength - 5/5 normal muscle strength - Left Upper Extremity, Left Lower Extremity, Right Upper Extremity  (except hand ) and Right Lower Extremity. 5-/5 reduced muscle strength - Right Upper Extremity  (hand ) . Reflexes (Dermatomes) 2/2 Normal - All. Plantar Reflexes (L4-S2) - Bilateral - Flexion - Bilateral. Coordination - Normal. Gait - Normal.  Results of Diagnostic Studies  Labs: Note: 10/12/16 Lamictal 3.1(200mg/day) 5/26/17 topiramate 2.0 (25mg/day), lamotrigine 2.0 (200mg/day), 11/27/17 lamotrigine 1.9 ( 400mg/day, noncompliant), topiramate less than 0.5 (25mg/day, noncompliant),  4/11/18 lamotrigine 4.0 (400mg/day), topiramate 1.3(100mg/day). 8/5/19 lamotrigine 5.5(400mg/day), topiramate 5.6(200mg/day).  Imaging: MRI: Note: 11/24/15 brain: reported normal, cervical spine, unremarkable. 11/27/15 T3-4 disc herniation, no spinal cord compression. 11/28/15 lumbar spine: L3-4 juxta articular synovial cyst with right posterolateral thecal sac effacement, mild compression on right L4 nerve root. Images reviewed.   Neurophysiological Testing - Note: 12/18/15 routine EEG: normal. 12/23/15: NCS/EMG: normal. 1/25/16 EEG: rare sharp waves recorded over the left frontotemporal region. 6/21/17 EEG: normal. 7/16/18 EEG: normal. 8/7/19 EEG: normal. 9/21/21 EEG: normal.  Neuropsychiatric Mental status exam performed with findings of - no evidence of hallucinations, delusions, obsessions or homicidal/suicidal ideation.  Musculoskeletal Spine/Ribs/Pelvis Cervical Spine - Examination of the cervical spine reveals - no tenderness to palpation, no pain, normal cervical spine movements and normal posture. Thoracic (Dorsal) Spine - Examination of the thoracic spine reveals - no tenderness over thoracic vertebrae, no pain, no paraspinous muscle spasm and normal thoracic spine movements. Lumbosacral Spine - Evaluation of related systems reveals - Straight leg raising negative. Examination of the lumbosacral spine reveals - no tenderness to palpation, no pain, no paraspinous muscle spasm and normal lumbosacral spine movements. Upper Extremity  Ulna: Inspection and Palpation - Tenderness - Tinel sign at cubital tunnel  (positive right side) . Hand/Wrist: Wrist: Inspection and Palpation - Tenderness - Tinel sign of wrist negative and Phalan sign of wrist negative.

## 2024-02-23 NOTE — ASU PATIENT PROFILE, ADULT - FALL HARM RISK - HARM RISK INTERVENTIONS
NEED SCOOTERS/Communicate Risk of Fall with Harm to all staff/Reinforce activity limits and safety measures with patient and family/Tailored Fall Risk Interventions/Visual Cue: Yellow wristband and red socks/Bed in lowest position, wheels locked, appropriate side rails in place/Call bell, personal items and telephone in reach/Instruct patient to call for assistance before getting out of bed or chair/Non-slip footwear when patient is out of bed/[place] to call system/Physically safe environment - no spills, clutter or unnecessary equipment/Purposeful Proactive Rounding/Room/bathroom lighting operational, light cord in reach

## 2024-02-26 ENCOUNTER — TRANSCRIPTION ENCOUNTER (OUTPATIENT)
Age: 59
End: 2024-02-26

## 2024-02-26 ENCOUNTER — APPOINTMENT (OUTPATIENT)
Dept: PLASTIC SURGERY | Facility: AMBULATORY SURGERY CENTER | Age: 59
End: 2024-02-26
Payer: MEDICARE

## 2024-02-26 ENCOUNTER — OUTPATIENT (OUTPATIENT)
Dept: OUTPATIENT SERVICES | Facility: HOSPITAL | Age: 59
LOS: 1 days | Discharge: HOME | End: 2024-02-26
Payer: MEDICARE

## 2024-02-26 ENCOUNTER — RESULT REVIEW (OUTPATIENT)
Age: 59
End: 2024-02-26

## 2024-02-26 VITALS
HEIGHT: 62 IN | WEIGHT: 192.02 LBS | OXYGEN SATURATION: 100 % | HEART RATE: 62 BPM | DIASTOLIC BLOOD PRESSURE: 74 MMHG | RESPIRATION RATE: 20 BRPM | TEMPERATURE: 98 F | SYSTOLIC BLOOD PRESSURE: 130 MMHG

## 2024-02-26 VITALS
HEART RATE: 89 BPM | OXYGEN SATURATION: 98 % | RESPIRATION RATE: 18 BRPM | SYSTOLIC BLOOD PRESSURE: 117 MMHG | DIASTOLIC BLOOD PRESSURE: 51 MMHG

## 2024-02-26 DIAGNOSIS — Z90.89 ACQUIRED ABSENCE OF OTHER ORGANS: Chronic | ICD-10-CM

## 2024-02-26 DIAGNOSIS — L98.7 EXCESSIVE AND REDUNDANT SKIN AND SUBCUTANEOUS TISSUE: ICD-10-CM

## 2024-02-26 DIAGNOSIS — Z87.19 PERSONAL HISTORY OF OTHER DISEASES OF THE DIGESTIVE SYSTEM: Chronic | ICD-10-CM

## 2024-02-26 DIAGNOSIS — Z98.891 HISTORY OF UTERINE SCAR FROM PREVIOUS SURGERY: Chronic | ICD-10-CM

## 2024-02-26 DIAGNOSIS — Z98.890 OTHER SPECIFIED POSTPROCEDURAL STATES: Chronic | ICD-10-CM

## 2024-02-26 DIAGNOSIS — Z86.018 PERSONAL HISTORY OF OTHER BENIGN NEOPLASM: Chronic | ICD-10-CM

## 2024-02-26 PROCEDURE — C1889: CPT

## 2024-02-26 PROCEDURE — 88302 TISSUE EXAM BY PATHOLOGIST: CPT | Mod: 26

## 2024-02-26 PROCEDURE — C9399: CPT

## 2024-02-26 PROCEDURE — 15830 EXC EXCESSIVE SKIN ABDOMEN: CPT

## 2024-02-26 PROCEDURE — 88302 TISSUE EXAM BY PATHOLOGIST: CPT

## 2024-02-26 RX ORDER — HYDROMORPHONE HYDROCHLORIDE 2 MG/ML
0.5 INJECTION INTRAMUSCULAR; INTRAVENOUS; SUBCUTANEOUS
Refills: 0 | Status: DISCONTINUED | OUTPATIENT
Start: 2024-02-26 | End: 2024-02-26

## 2024-02-26 RX ORDER — ONDANSETRON 8 MG/1
4 TABLET, FILM COATED ORAL ONCE
Refills: 0 | Status: DISCONTINUED | OUTPATIENT
Start: 2024-02-26 | End: 2024-02-26

## 2024-02-26 RX ORDER — HEPARIN SODIUM 5000 [USP'U]/ML
7500 INJECTION INTRAVENOUS; SUBCUTANEOUS ONCE
Refills: 0 | Status: COMPLETED | OUTPATIENT
Start: 2024-02-26 | End: 2024-02-26

## 2024-02-26 RX ORDER — FLUCONAZOLE 150 MG/1
200 TABLET ORAL ONCE
Refills: 0 | Status: DISCONTINUED | OUTPATIENT
Start: 2024-02-26 | End: 2024-02-26

## 2024-02-26 RX ORDER — OXYCODONE HYDROCHLORIDE 5 MG/1
5 TABLET ORAL ONCE
Refills: 0 | Status: DISCONTINUED | OUTPATIENT
Start: 2024-02-26 | End: 2024-02-26

## 2024-02-26 RX ORDER — SODIUM CHLORIDE 9 MG/ML
1000 INJECTION, SOLUTION INTRAVENOUS
Refills: 0 | Status: DISCONTINUED | OUTPATIENT
Start: 2024-02-26 | End: 2024-02-26

## 2024-02-26 RX ADMIN — HEPARIN SODIUM 7500 UNIT(S): 5000 INJECTION INTRAVENOUS; SUBCUTANEOUS at 09:36

## 2024-02-26 RX ADMIN — SODIUM CHLORIDE 100 MILLILITER(S): 9 INJECTION, SOLUTION INTRAVENOUS at 13:12

## 2024-02-26 NOTE — CHART NOTE - NSCHARTNOTEFT_GEN_A_CORE
PACU ANESTHESIA ADMISSION NOTE      Procedure:   Post op diagnosis:      ____  Intubated  TV:______       Rate: ______      FiO2: ______    _x___  Patent Airway    _x___  Full return of protective reflexes    _x___  Full recovery from anesthesia / back to baseline status    Vitals  SPO2:-99  HR:-90  RR:-11  B.P:-115/61  TEMP:-98    Mental Status:  _x___ Awake   ___x_ Alert   _____ Drowsy   _____ Sedated    Nausea/Vomiting:  _x___  NO       ______Yes,   See Post - Op Orders         Pain Scale (0-10):  __0___    Treatment: _x___ None    __x__ See Post - Op/PCA Orders    Post - Operative Fluids:   ___ Oral   ____x See Post - Op Orders    Plan: Discharge:   __x__Home       ____Floor     _____Critical Care    _____  Other:_________________    Comments:  Report endorsed to RN in pacu  Vitals stable  No anesthesia issues or complications noted.  Discharge to patient to  home when criteria met.

## 2024-02-26 NOTE — PRE-ANESTHESIA EVALUATION ADULT - NSANTHPMHFT_GEN_ALL_CORE
METS>4 without CP/palpitations/sob  Denies orthopnea  well controlled epilepsy - partial seizures, last >3 years ago  fibromyalgia, prescribed COT but does not take them  h/o gastric band, s/p removal, bypass surgery, hiatal hernia repair. GERD well controlled

## 2024-02-26 NOTE — BRIEF OPERATIVE NOTE - NSICDXBRIEFPREOP_GEN_ALL_CORE_FT
PRE-OP DIAGNOSIS:  Panniculitis 26-Feb-2024 13:22:46  Reyna Samuels  Status post bariatric surgery 26-Feb-2024 13:23:22  Reyna Samuels

## 2024-02-26 NOTE — ASU DISCHARGE PLAN (ADULT/PEDIATRIC) - ASU DC SPECIAL INSTRUCTIONSFT
Keep surgical site clean and dry.   Do not remove any dressings or get them wet.   Keep abdominal binder in place at all times.  Empty and strip both drains and record output in ml as instructed.   Sleep in beach chair position with head elevated and knees bent to reduce tension on the abdomen.     Rest, no lifting.

## 2024-02-26 NOTE — BRIEF OPERATIVE NOTE - NSICDXBRIEFPOSTOP_GEN_ALL_CORE_FT
POST-OP DIAGNOSIS:  Status post bariatric surgery 26-Feb-2024 13:23:40  Reyna Samuels  Panniculitis 26-Feb-2024 13:23:31  Reyna Samuels

## 2024-02-26 NOTE — ASU DISCHARGE PLAN (ADULT/PEDIATRIC) - CARE PROVIDER_API CALL
Romario Gilliland  Plastic Surgery  [address] [place] [id number]  Phone: [phone]  Fax: [phone]  Follow Up Time:

## 2024-02-26 NOTE — ASU DISCHARGE PLAN (ADULT/PEDIATRIC) - MEDICATION INSTRUCTIONS
patient has Doxycycline and Tramadol prescribed earlier, take extra strength Tylenol every 6 hours, Gabapentin twice daily starting tonight and Tramadol for severe pain only - take one before bedtime tonight!

## 2024-02-26 NOTE — ASU DISCHARGE PLAN (ADULT/PEDIATRIC) - CALL YOUR DOCTOR IF YOU HAVE ANY OF THE FOLLOWING:
Bleeding that does not stop/Swelling that gets worse/Pain not relieved by Medications/Wound/Surgical Site with redness, or foul smelling discharge or pus
3 = A little assistance

## 2024-02-27 ENCOUNTER — NON-APPOINTMENT (OUTPATIENT)
Age: 59
End: 2024-02-27

## 2024-03-04 LAB — SURGICAL PATHOLOGY STUDY: SIGNIFICANT CHANGE UP

## 2024-03-05 ENCOUNTER — APPOINTMENT (OUTPATIENT)
Dept: PLASTIC SURGERY | Facility: CLINIC | Age: 59
End: 2024-03-05
Payer: MEDICARE

## 2024-03-05 DIAGNOSIS — Z88.3 ALLERGY STATUS TO OTHER ANTI-INFECTIVE AGENTS: ICD-10-CM

## 2024-03-05 DIAGNOSIS — R63.4 ABNORMAL WEIGHT LOSS: ICD-10-CM

## 2024-03-05 DIAGNOSIS — E03.9 HYPOTHYROIDISM, UNSPECIFIED: ICD-10-CM

## 2024-03-05 DIAGNOSIS — F32.A DEPRESSION, UNSPECIFIED: ICD-10-CM

## 2024-03-05 DIAGNOSIS — F41.9 ANXIETY DISORDER, UNSPECIFIED: ICD-10-CM

## 2024-03-05 DIAGNOSIS — Z87.891 PERSONAL HISTORY OF NICOTINE DEPENDENCE: ICD-10-CM

## 2024-03-05 DIAGNOSIS — I10 ESSENTIAL (PRIMARY) HYPERTENSION: ICD-10-CM

## 2024-03-05 DIAGNOSIS — Z98.84 BARIATRIC SURGERY STATUS: ICD-10-CM

## 2024-03-05 DIAGNOSIS — L98.7 EXCESSIVE AND REDUNDANT SKIN AND SUBCUTANEOUS TISSUE: ICD-10-CM

## 2024-03-05 PROCEDURE — 99024 POSTOP FOLLOW-UP VISIT: CPT

## 2024-03-06 NOTE — ASSESSMENT
[FreeTextEntry1] : 59 yo F with h/o MWL s/p bariatric surgery now POD#8 s/p extended panniculectomy. Doing very well and happy with results.   - right drain removed and all dressings changed - keep left drain in place  - continue abdominal binder  - keep incision dry - pathology discussed- 9.5 lbs of benign tissue removed, report given to pt  - post-op instructions reviewed and all her questions were answered - f/u next week for drain removal  Seen with Dr. Gilliland.

## 2024-03-06 NOTE — DATA REVIEWED
[FreeTextEntry1] : [place] Accession Number : [id number] Patient:     PING GORE   Accession:                             [id number]  Collected Date/Time:                   2/26/2024 11:35 EST Received Date/Time:                    2/26/2024 11:45 EST  Surgical Pathology Report - Auth (Verified)  Specimen(s) Submitted Abdominal pannus  Final Diagnosis  Abdominal pannus, excision: - Skin and underlying fibroadipose tissue with no significant pathologic findings  Verified by: Pravin Benavides MD (Electronic Signature) Reported on: 03/04/24 15:56 EST, [facility], One [facility], [address] [place] [address] Histology technical processing performed at [facility], [address] [place] [address] Phone: [phone]   Fax: [phone] _________________________________________________________________   Clinical Information Panniculectomy  Perioperative Diagnosis Weight loss  Postoperative Diagnosis Weight loss  Gross Description The specimen received fresh is labeled "abdominal pannus". The specimen  consists of a butterfly-shaped excision, measuring 40 x 30 x 3 cm of skin and subcutaneous tissue and 2 separate fragments, measuring 25 x 15 x 1.5 cm aggregate of skin and subcutaneous tissue.  The specimen weighs 4310 g in aggregate. Sectioning reveals no obvious lesions. Representative sections are submitted. (3 blocks)  02/28/2024 08:52:42 EST    OC

## 2024-03-06 NOTE — PHYSICAL EXAM
[de-identified] : [id number] pleasant female, NAD [de-identified] : nonlabored breathing  [de-identified] : soft, incision healing well, c/d/i, no erythema, scattered superficial skin irritation secondary to adhesive, umbilicus viable, excellent overall contour, drains functional with ss output  [de-identified] : no edema or calf tenderness

## 2024-03-06 NOTE — HISTORY OF PRESENT ILLNESS
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 Patient reports history of Lap Band in 2009 with Dr. Adam in [facility], s/p band revision & hernia repair (no mesh) in 2017 and then conversion to gastric bypass in March 2022 with . Pt states nausea/vomiting resolved. Pt still follows up with  office, was started on Ozempic 1 year ago which prompted an additional 60lbs. Pt with overall 150lbs weight loss (348lbs-->196lbs). Weight stable within 5-10lbs for approximately 3 months, BMI 34.9. Pt had nutritional labs done recently, does not have a copy, unsure of any deficiencies. Pt endorses h/o severe rashes and irritation under both her breast and her abd pannus, pt has used anti-fungal powder and OTC ointments for over 6 months without improvement. Pt has severe bra strap grooving as well. Overhanging abdominal pannus affects low self esteem and daily interactions. Pt states that she has tremendous difficulty walking, to the point she has felt bed bound at times. States her pannnus has affected her ability to get in and out of the shower, making cleanliness difficult. Patient reports all over back pain secondary to weight of pannus, pt received her 1st spinal shot by  (in [place]) last week, without any relief yet. Pt also went to physical therapy for over 6 months without any relief.  Pt was on high dose pain medication (fentanyl, oxycodone, more) however  stopped all of them and plans on prescribing 1 narcotic to decrease r/o dependency. Pt is due for a mammo, her last one 1 year ago was WNL. Denies any personal or family h/o breast cancer.  Desires surgical correction for both her breasts and abdominal pannus. No other pertinent medical or surgical history.  Pt has supporting letters from multiple doctors recommended panniculectomy/ breast reduction surgery. Ortho states pain is NOT 2/2 spinal issues, derm noted h/o chronic rashes since 2017, etc. Current bra size: 42F Desires size: as small as possible  Former smoker, quit 10 years ago. Smokes sometimes, last cigarette was 5M ago. Occ: On disability x10 years for chronic pain issues Previously saw  however very unhappy  Interval hx 2/15/24: here for questions prior to surgery on 2/26, concerned with area of mons  Interval hx (3/5/24). Pt here POD#8 s/p extended panniculectomy. Doing very well with improving incisional discomfort. Ambulating. Taking all prescribed medications. Denies any f/c or bleeding. Drains functional.

## 2024-03-08 ENCOUNTER — APPOINTMENT (OUTPATIENT)
Dept: PLASTIC SURGERY | Facility: CLINIC | Age: 59
End: 2024-03-08
Payer: MEDICARE

## 2024-03-08 PROBLEM — Z86.69 PERSONAL HISTORY OF OTHER DISEASES OF THE NERVOUS SYSTEM AND SENSE ORGANS: Chronic | Status: ACTIVE | Noted: 2024-02-12

## 2024-03-08 PROCEDURE — 99024 POSTOP FOLLOW-UP VISIT: CPT

## 2024-03-08 NOTE — HISTORY OF PRESENT ILLNESS
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 Patient reports history of Lap Band in 2009 with Dr. Adam in [facility], s/p band revision & hernia repair (no mesh) in 2017 and then conversion to gastric bypass in March 2022 with . Pt states nausea/vomiting resolved. Pt still follows up with  office, was started on Ozempic 1 year ago which prompted an additional 60lbs. Pt with overall 150lbs weight loss (348lbs-->196lbs). Weight stable within 5-10lbs for approximately 3 months, BMI 34.9. Pt had nutritional labs done recently, does not have a copy, unsure of any deficiencies. Pt endorses h/o severe rashes and irritation under both her breast and her abd pannus, pt has used anti-fungal powder and OTC ointments for over 6 months without improvement. Pt has severe bra strap grooving as well. Overhanging abdominal pannus affects low self esteem and daily interactions. Pt states that she has tremendous difficulty walking, to the point she has felt bed bound at times. States her pannnus has affected her ability to get in and out of the shower, making cleanliness difficult. Patient reports all over back pain secondary to weight of pannus, pt received her 1st spinal shot by  (in [place]) last week, without any relief yet. Pt also went to physical therapy for over 6 months without any relief.  Pt was on high dose pain medication (fentanyl, oxycodone, more) however  stopped all of them and plans on prescribing 1 narcotic to decrease r/o dependency. Pt is due for a mammo, her last one 1 year ago was WNL. Denies any personal or family h/o breast cancer.  Desires surgical correction for both her breasts and abdominal pannus. No other pertinent medical or surgical history.  Pt has supporting letters from multiple doctors recommended panniculectomy/ breast reduction surgery. Ortho states pain is NOT 2/2 spinal issues, derm noted h/o chronic rashes since 2017, etc. Current bra size: 42F Desires size: as small as possible  Former smoker, quit 10 years ago. Smokes sometimes, last cigarette was 5M ago. Occ: On disability x10 years for chronic pain issues Previously saw  however very unhappy  Interval hx 2/15/24: here for questions prior to surgery on 2/26, concerned with area of mons  Interval hx (3/5/24). Pt here POD#8 s/p extended panniculectomy. Doing very well with improving incisional discomfort. Ambulating. Taking all prescribed medications. Denies any f/c or bleeding. Drains functional.   Interval hx (3/8/24). Pt here POD#11 s/p extended panniculectomy. Pt called reporting that drain stitch fell out and with leaking around drain site. States drain is still functional with >30cc ouput x24 hrs & collapsed bulb. Here for evaluation.

## 2024-03-08 NOTE — ASSESSMENT
[FreeTextEntry1] : 57 yo F with h/o MWL s/p bariatric surgery now POD#8 s/p extended panniculectomy. Doing very well and happy with results.   - keep left drain in place, reinforced with gauze and tegaderm to keep in place - continue abdominal binder  - keep incision dry - pathology discussed- 9.5 lbs of benign tissue removed, report given to pt  - post-op instructions reviewed and all her questions were answered - Keep f/u next week for drain removal

## 2024-03-08 NOTE — PHYSICAL EXAM
[de-identified] : [id number] pleasant female, NAD [de-identified] : nonlabored breathing  [de-identified] : no edema or calf tenderness  [de-identified] : soft, incision healing well, c/d/i, no erythema, scattered superficial skin irritation secondary to adhesive, umbilicus viable, excellent overall contour, L drain functional with ss output in bulb and still attached with prolene, no active drainage

## 2024-03-08 NOTE — DATA REVIEWED
[FreeTextEntry1] : [place] Accession Number : [id number] Patient:     PING GORE   Accession:                             [id number]  Collected Date/Time:                   2/26/2024 11:35 EST Received Date/Time:                    2/26/2024 11:45 EST  Surgical Pathology Report - Auth (Verified)  Specimen(s) Submitted Abdominal pannus  Final Diagnosis  Abdominal pannus, excision: - Skin and underlying fibroadipose tissue with no significant pathologic findings  Verified by: Pravin Benavides MD (Electronic Signature) Reported on: 03/04/24 15:56 EST, [facility], One [facility], [address] [place] [address] Histology technical processing performed at [facility], [address] [place] [address] Phone: [phone]   Fax: [phone] _________________________________________________________________   Clinical Information Panniculectomy  Perioperative Diagnosis Weight loss  Postoperative Diagnosis Weight loss  Gross Description The specimen received fresh is labeled "abdominal pannus". The specimen  consists of a butterfly-shaped excision, measuring 40 x 30 x 3 cm of skin and subcutaneous tissue and 2 separate fragments, measuring 25 x 15 x 1.5 cm aggregate of skin and subcutaneous tissue.  The specimen weighs 4310 g in aggregate. Sectioning reveals no obvious lesions. Representative sections are submitted. (3 blocks)  02/28/2024 08:52:42 EST    OC

## 2024-03-14 ENCOUNTER — APPOINTMENT (OUTPATIENT)
Dept: PLASTIC SURGERY | Facility: CLINIC | Age: 59
End: 2024-03-14
Payer: MEDICARE

## 2024-03-14 PROCEDURE — 99024 POSTOP FOLLOW-UP VISIT: CPT

## 2024-03-19 ENCOUNTER — APPOINTMENT (OUTPATIENT)
Dept: PLASTIC SURGERY | Facility: CLINIC | Age: 59
End: 2024-03-19
Payer: MEDICARE

## 2024-03-19 PROCEDURE — 99024 POSTOP FOLLOW-UP VISIT: CPT

## 2024-03-19 NOTE — HISTORY OF PRESENT ILLNESS
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 Patient reports history of Lap Band in 2009 with Dr. Adam in [facility], s/p band revision & hernia repair (no mesh) in 2017 and then conversion to gastric bypass in March 2022 with . Pt states nausea/vomiting resolved. Pt still follows up with  office, was started on Ozempic 1 year ago which prompted an additional 60lbs. Pt with overall 150lbs weight loss (348lbs-->196lbs). Weight stable within 5-10lbs for approximately 3 months, BMI 34.9. Pt had nutritional labs done recently, does not have a copy, unsure of any deficiencies. Pt endorses h/o severe rashes and irritation under both her breast and her abd pannus, pt has used anti-fungal powder and OTC ointments for over 6 months without improvement. Pt has severe bra strap grooving as well. Overhanging abdominal pannus affects low self esteem and daily interactions. Pt states that she has tremendous difficulty walking, to the point she has felt bed bound at times. States her pannnus has affected her ability to get in and out of the shower, making cleanliness difficult. Patient reports all over back pain secondary to weight of pannus, pt received her 1st spinal shot by  (in [place]) last week, without any relief yet. Pt also went to physical therapy for over 6 months without any relief.  Pt was on high dose pain medication (fentanyl, oxycodone, more) however  stopped all of them and plans on prescribing 1 narcotic to decrease r/o dependency. Pt is due for a mammo, her last one 1 year ago was WNL. Denies any personal or family h/o breast cancer.  Desires surgical correction for both her breasts and abdominal pannus. No other pertinent medical or surgical history.  Pt has supporting letters from multiple doctors recommended panniculectomy/ breast reduction surgery. Ortho states pain is NOT 2/2 spinal issues, derm noted h/o chronic rashes since 2017, etc. Current bra size: 42F Desires size: as small as possible  Former smoker, quit 10 years ago. Smokes sometimes, last cigarette was 5M ago. Occ: On disability x10 years for chronic pain issues Previously saw  however very unhappy  Interval hx 2/15/24: here for questions prior to surgery on 2/26, concerned with area of mons  Interval hx (3/5/24). Pt here POD#8 s/p extended panniculectomy. Doing very well with improving incisional discomfort. Ambulating. Taking all prescribed medications. Denies any f/c or bleeding. Drains functional.   Interval hx (3/8/24). Pt here POD#11 s/p extended panniculectomy. Pt called reporting that drain stitch fell out and with leaking around drain site. States drain is still functional with >30cc ouput x24 hrs & collapsed bulb. Here for evaluation.   Interval hx (3/14/24). Pt here POD#17 s/p extended panniculectomy. Doing well and happy with results. Denies any f/c or drainage. Concerned with prominent mons and positioning of umbilicus. Drains both functional.   Interval hx (3/19/24). Pt presents today POD#22 s/p extended panniculectomy. Doing well and happy with results. Offers no new complaints but questioning prominent mons and positioning of umbilicus. Drain functional.

## 2024-03-19 NOTE — HISTORY OF PRESENT ILLNESS
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 Patient reports history of Lap Band in 2009 with Dr. Adam in [facility], s/p band revision & hernia repair (no mesh) in 2017 and then conversion to gastric bypass in March 2022 with . Pt states nausea/vomiting resolved. Pt still follows up with  office, was started on Ozempic 1 year ago which prompted an additional 60lbs. Pt with overall 150lbs weight loss (348lbs-->196lbs). Weight stable within 5-10lbs for approximately 3 months, BMI 34.9. Pt had nutritional labs done recently, does not have a copy, unsure of any deficiencies. Pt endorses h/o severe rashes and irritation under both her breast and her abd pannus, pt has used anti-fungal powder and OTC ointments for over 6 months without improvement. Pt has severe bra strap grooving as well. Overhanging abdominal pannus affects low self esteem and daily interactions. Pt states that she has tremendous difficulty walking, to the point she has felt bed bound at times. States her pannnus has affected her ability to get in and out of the shower, making cleanliness difficult. Patient reports all over back pain secondary to weight of pannus, pt received her 1st spinal shot by  (in [place]) last week, without any relief yet. Pt also went to physical therapy for over 6 months without any relief.  Pt was on high dose pain medication (fentanyl, oxycodone, more) however  stopped all of them and plans on prescribing 1 narcotic to decrease r/o dependency. Pt is due for a mammo, her last one 1 year ago was WNL. Denies any personal or family h/o breast cancer.  Desires surgical correction for both her breasts and abdominal pannus. No other pertinent medical or surgical history.  Pt has supporting letters from multiple doctors recommended panniculectomy/ breast reduction surgery. Ortho states pain is NOT 2/2 spinal issues, derm noted h/o chronic rashes since 2017, etc. Current bra size: 42F Desires size: as small as possible  Former smoker, quit 10 years ago. Smokes sometimes, last cigarette was 5M ago. Occ: On disability x10 years for chronic pain issues Previously saw  however very unhappy  Interval hx 2/15/24: here for questions prior to surgery on 2/26, concerned with area of mons  Interval hx (3/5/24). Pt here POD#8 s/p extended panniculectomy. Doing very well with improving incisional discomfort. Ambulating. Taking all prescribed medications. Denies any f/c or bleeding. Drains functional.   Interval hx (3/8/24). Pt here POD#11 s/p extended panniculectomy. Pt called reporting that drain stitch fell out and with leaking around drain site. States drain is still functional with >30cc ouput x24 hrs & collapsed bulb. Here for evaluation.   Interval hx (3/14/24). Pt here POD#17 s/p extended panniculectomy. Doing well and happy with results. Denies any f/c or drainage. Concerned with prominent mons and positioning of umbilicus. Drains both functional.

## 2024-03-19 NOTE — PHYSICAL EXAM
[de-identified] : [id number] pleasant female, NAD [de-identified] : nonlabored breathing  [de-identified] : soft, incision healing well, c/d/i, no erythema, umbilicus viable, excellent overall contour,  L drain functional with ss output, minimal  [de-identified] : no edema or calf tenderness

## 2024-03-19 NOTE — DATA REVIEWED
[FreeTextEntry1] : [place] Accession Number : [id number] Patient:     PING GORE   Accession:                             [id number]  Collected Date/Time:                   2/26/2024 11:35 EST Received Date/Time:                    2/26/2024 11:45 EST  Surgical Pathology Report - Auth (Verified)  Specimen(s) Submitted Abdominal pannus  Final Diagnosis  Abdominal pannus, excision: - Skin and underlying fibroadipose tissue with no significant pathologic findings  Verified by: Pravin Benavides MD (Electronic Signature) Reported on: 03/04/24 15:56 EST, [facility], One [facility], [address] [place] [address] Histology technical processing performed at [facility], [address] [place] [address] Phone: [phone]   Fax: [phone] _________________________________________________________________   Clinical Information Panniculectomy  Perioperative Diagnosis Weight loss  Postoperative Diagnosis Weight loss  Gross Description The specimen received fresh is labeled "abdominal pannus". The specimen  consists of a butterfly-shaped excision, measuring 40 x 30 x 3 cm of skin and subcutaneous tissue and 2 separate fragments, measuring 25 x 15 x 1.5 cm aggregate of skin and subcutaneous tissue.  The specimen weighs 4310 g in aggregate. Sectioning reveals no obvious lesions. Representative sections are submitted. (3 blocks)  02/28/2024 08:52:42 EST    OC

## 2024-03-19 NOTE — ASSESSMENT
[FreeTextEntry1] : 57 yo F with h/o MWL s/p bariatric surgery now POD#17 s/p extended panniculectomy. Doing very well and happy with results.   - keep left drain in place due to high output  - continue abdominal binder  - keep incision dry - pathology discussed- 9.5 lbs of benign tissue removed, report given to pt  - post-op instructions reviewed and all her questions were answered - Keep f/u next week for drain removal

## 2024-03-19 NOTE — DATA REVIEWED
[FreeTextEntry1] : [place] Accession Number : [id number] Patient:     PING GORE   Accession:                             [id number]  Collected Date/Time:                   2/26/2024 11:35 EST Received Date/Time:                    2/26/2024 11:45 EST  Surgical Pathology Report - Auth (Verified)  Specimen(s) Submitted Abdominal pannus  Final Diagnosis  Abdominal pannus, excision: - Skin and underlying fibroadipose tissue with no significant pathologic findings  Verified by: Pravin Benavides MD (Electronic Signature) Reported on: 03/04/24 15:56 EST, [facility], One [facility], [address] [place] [address] Histology technical processing performed at [facility], [address] [place] [address] Phone: [phone]   Fax: [phone] _________________________________________________________________   Clinical Information Panniculectomy  Perioperative Diagnosis Weight loss  Postoperative Diagnosis Weight loss  Gross Description The specimen received fresh is labeled "abdominal pannus". The specimen  consists of a butterfly-shaped excision, measuring 40 x 30 x 3 cm of skin and subcutaneous tissue and 2 separate fragments, measuring 25 x 15 x 1.5 cm aggregate of skin and subcutaneous tissue.  The specimen weighs 4310 g in aggregate. Sectioning reveals no obvious lesions. Representative sections are submitted. (3 blocks)  02/28/2024 08:52:42 EST    OC

## 2024-03-19 NOTE — ASSESSMENT
[FreeTextEntry1] : 59 yo F with h/o MWL s/p bariatric surgery now POD#22 s/p extended panniculectomy. Doing very well and happy with results.   - left drain removed, supplies given for site care  - may shower  - daily Aquaphor to incisions - continue abdominal binder  - pathology discussed- 9.5 lbs of benign tissue removed, report given to pt  - post-op instructions reviewed and all her questions were answered - f/u 1 month with Dr. Gilliland.

## 2024-03-19 NOTE — PHYSICAL EXAM
[de-identified] : nonlabored breathing  [de-identified] : [id number] pleasant female, NAD [de-identified] : soft, incision healing well, c/d/i, no erythema, scattered superficial skin irritation secondary to adhesive, umbilicus viable, excellent overall contour,  L drain functional with ss output in bulb and still attached with prolene, no active drainage  [de-identified] : no edema or calf tenderness

## 2024-04-16 ENCOUNTER — APPOINTMENT (OUTPATIENT)
Dept: PLASTIC SURGERY | Facility: CLINIC | Age: 59
End: 2024-04-16
Payer: MEDICARE

## 2024-04-16 VITALS — BODY MASS INDEX: 34.04 KG/M2 | WEIGHT: 185 LBS | HEIGHT: 62 IN

## 2024-04-16 DIAGNOSIS — M79.3 PANNICULITIS, UNSPECIFIED: ICD-10-CM

## 2024-04-16 PROCEDURE — 99024 POSTOP FOLLOW-UP VISIT: CPT

## 2024-04-16 NOTE — DATA REVIEWED
[FreeTextEntry1] : [place] Accession Number : [id number] Patient:     PING GORE   Accession:                             [id number]  Collected Date/Time:                   2/26/2024 11:35 EST Received Date/Time:                    2/26/2024 11:45 EST  Surgical Pathology Report - Auth (Verified)  Specimen(s) Submitted Abdominal pannus  Final Diagnosis  Abdominal pannus, excision: - Skin and underlying fibroadipose tissue with no significant pathologic findings  Verified by: Pravin Benavides MD (Electronic Signature) Reported on: 03/04/24 15:56 EST, [facility], [facility], [address] [place] [address] Histology technical processing performed at [facility], [address] [place] [address] Phone: [phone]   Fax: [phone] _________________________________________________________________   Clinical Information Panniculectomy  Perioperative Diagnosis Weight loss  Postoperative Diagnosis Weight loss  Gross Description The specimen received fresh is labeled "abdominal pannus". The specimen  consists of a butterfly-shaped excision, measuring 40 x 30 x 3 cm of skin and subcutaneous tissue and 2 separate fragments, measuring 25 x 15 x 1.5 cm aggregate of skin and subcutaneous tissue.  The specimen weighs 4310 g in aggregate. Sectioning reveals no obvious lesions. Representative sections are submitted. (3 blocks)  02/28/2024 08:52:42 EST    OC

## 2024-04-16 NOTE — HISTORY OF PRESENT ILLNESS
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 Patient reports history of Lap Band in 2009 with Dr. Adam in [facility], s/p band revision & hernia repair (no mesh) in 2017 and then conversion to gastric bypass in March 2022 with . Pt states nausea/vomiting resolved. Pt still follows up with  office, was started on Ozempic 1 year ago which prompted an additional 60lbs. Pt with overall 150lbs weight loss (348lbs-->196lbs). Weight stable within 5-10lbs for approximately 3 months, BMI 34.9. Pt had nutritional labs done recently, does not have a copy, unsure of any deficiencies. Pt endorses h/o severe rashes and irritation under both her breast and her abd pannus, pt has used anti-fungal powder and OTC ointments for over 6 months without improvement. Pt has severe bra strap grooving as well. Overhanging abdominal pannus affects low self esteem and daily interactions. Pt states that she has tremendous difficulty walking, to the point she has felt bed bound at times. States her pannus has affected her ability to get in and out of the shower, making cleanliness difficult. Patient reports all over back pain secondary to weight of pannus, pt received her 1st spinal shot by  (in [place]) last week, without any relief yet. Pt also went to physical therapy for over 6 months without any relief.  Pt was on high dose pain medication (fentanyl, oxycodone, more) however  stopped all of them and plans on prescribing 1 narcotic to decrease r/o dependency. Pt is due for a mammo, her last one 1 year ago was WNL. Denies any personal or family h/o breast cancer.  Desires surgical correction for both her breasts and abdominal pannus. No other pertinent medical or surgical history.  Pt has supporting letters from multiple doctors recommended panniculectomy/ breast reduction surgery. Ortho states pain is NOT 2/2 spinal issues, derm noted h/o chronic rashes since 2017, etc. Current bra size: 42F Desires size: as small as possible  Former smoker, quit 10 years ago. Smokes sometimes, last cigarette was 5M ago. Occ: On disability x10 years for chronic pain issues Previously saw  however very unhappy  Interval hx 2/15/24: here for questions prior to surgery on 2/26, concerned with area of mons  Interval hx (3/5/24). Pt here POD#8 s/p extended panniculectomy. Doing very well with improving incisional discomfort. Ambulating. Taking all prescribed medications. Denies any f/c or bleeding. Drains functional.   Interval hx (3/8/24). Pt here POD#11 s/p extended panniculectomy. Pt called reporting that drain stitch fell out and with leaking around drain site. States drain is still functional with >30cc ouput x24 hrs & collapsed bulb. Here for evaluation.   Interval hx (3/14/24). Pt here POD#17 s/p extended panniculectomy. Doing well and happy with results. Denies any f/c or drainage. Concerned with prominent mons and positioning of umbilicus. Drains both functional.   Interval hx (3/19/24). Pt presents today POD#22 s/p extended panniculectomy. Doing well and happy with results. Offers no new complaints but questioning prominent mons and positioning of umbilicus. Drain functional.   Interval hx (4/16/24). Pt presents today 2 months s/p extended panniculectomy. Doing well but concerned with prominent mons and residual skin laxity. She is also interested in bilateral breast reduction. She is currently 42F cup size and would like to be a C-cup. She c/o heavy deflated breasts causing neck and back pain, has deep shoulder grooving from bra straps and experiences frequent skin rashes in IMFs over the years treated multiple times with topical creams and both topical and oral antifungals. Recent mammogram 12/17/23 was WNL.

## 2024-04-16 NOTE — ASSESSMENT
[FreeTextEntry1] : 57 yo F with h/o MWL s/p bariatric surgery now 2 months /p extended panniculectomy. Doing very well and happy w results now c/w carmelo mons and interested in BBR.   - continue Scarguard and silicone tape to abdominal incision  - continue abdominal binder x 1 more week  - pathology discussed- 9.5 lbs of benign tissue removed, report given to pt  - post-op instructions reviewed and all her questions were answered - will schedule BBR with free nipple graft (anticipate 480g to be removed from each breast )

## 2024-04-16 NOTE — PHYSICAL EXAM
[Bra Size: _______] : Bra Size: [unfilled] [de-identified] : well-developed pleasant female, NAD [de-identified] : nonlabored breathing  [de-identified] : Bilateral breasts soft with superior deflation and Grade 4 ptosis, nipple sensation intact, no palpable nodules or LAD [de-identified] : soft, incision healing well, c/d/i, no erythema, umbilicus viable slightly high, excellent overall contour,  [de-identified] : prominent mons with skin laxity  [de-identified] : no edema or calf tenderness

## 2024-05-16 ENCOUNTER — APPOINTMENT (OUTPATIENT)
Dept: PLASTIC SURGERY | Facility: CLINIC | Age: 59
End: 2024-05-16
Payer: MEDICARE

## 2024-05-16 PROCEDURE — 99212 OFFICE O/P EST SF 10 MIN: CPT | Mod: 24

## 2024-05-16 NOTE — ASSESSMENT
[FreeTextEntry1] : 57 yo F with h/o MWL s/p bariatric surgery now 3 months /p extended panniculectomy. Doing very well and happy w results now c/w prominent mons and interested in BBR.   - continue Scarguard and silicone tape to abdominal incision  - continue abdominal binder x 1 more week  - pathology discussed- 9.5 lbs of benign tissue removed, report given to pt  -all her questions were answered - BBR with free nipple graft (anticipate 480g to be removed from each breast ) scheduled   5/16/2024 as above desires C cup will need free nipple graft  seoparately zarina in letter for upper arms  will do mons lift but at separate time than BR

## 2024-05-16 NOTE — HISTORY OF PRESENT ILLNESS
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 Patient reports history of Lap Band in 2009 with Dr. Adam in [facility], s/p band revision & hernia repair (no mesh) in 2017 and then conversion to gastric bypass in March 2022 with . Pt states nausea/vomiting resolved. Pt still follows up with  office, was started on Ozempic 1 year ago which prompted an additional 60lbs. Pt with overall 150lbs weight loss (348lbs-->196lbs). Weight stable within 5-10lbs for approximately 3 months, BMI 34.9. Pt had nutritional labs done recently, does not have a copy, unsure of any deficiencies. Pt endorses h/o severe rashes and irritation under both her breast and her abd pannus, pt has used anti-fungal powder and OTC ointments for over 6 months without improvement. Pt has severe bra strap grooving as well. Overhanging abdominal pannus affects low self esteem and daily interactions. Pt states that she has tremendous difficulty walking, to the point she has felt bed bound at times. States her pannus has affected her ability to get in and out of the shower, making cleanliness difficult. Patient reports all over back pain secondary to weight of pannus, pt received her 1st spinal shot by  (in [place]) last week, without any relief yet. Pt also went to physical therapy for over 6 months without any relief.  Pt was on high dose pain medication (fentanyl, oxycodone, more) however  stopped all of them and plans on prescribing 1 narcotic to decrease r/o dependency. Pt is due for a mammo, her last one 1 year ago was WNL. Denies any personal or family h/o breast cancer.  Desires surgical correction for both her breasts and abdominal pannus. No other pertinent medical or surgical history.  Pt has supporting letters from multiple doctors recommended panniculectomy/ breast reduction surgery. Ortho states pain is NOT 2/2 spinal issues, derm noted h/o chronic rashes since 2017, etc. Current bra size: 42F Desires size: as small as possible  Former smoker, quit 10 years ago. Smokes sometimes, last cigarette was 5M ago. Occ: On disability x10 years for chronic pain issues Previously saw  however very unhappy  Interval hx 2/15/24: here for questions prior to surgery on 2/26, concerned with area of mons  Interval hx (3/5/24). Pt here POD#8 s/p extended panniculectomy. Doing very well with improving incisional discomfort. Ambulating. Taking all prescribed medications. Denies any f/c or bleeding. Drains functional.   Interval hx (3/8/24). Pt here POD#11 s/p extended panniculectomy. Pt called reporting that drain stitch fell out and with leaking around drain site. States drain is still functional with >30cc ouput x24 hrs & collapsed bulb. Here for evaluation.   Interval hx (3/14/24). Pt here POD#17 s/p extended panniculectomy. Doing well and happy with results. Denies any f/c or drainage. Concerned with prominent mons and positioning of umbilicus. Drains both functional.   Interval hx (3/19/24). Pt presents today POD#22 s/p extended panniculectomy. Doing well and happy with results. Offers no new complaints but questioning prominent mons and positioning of umbilicus. Drain functional.   Interval hx (4/16/24). Pt presents today 2 months s/p extended panniculectomy. Doing well but concerned with prominent mons and residual skin laxity. She is also interested in bilateral breast reduction. She is currently 42F cup size and would like to be a C-cup. She c/o heavy deflated breasts causing neck and back pain, has deep shoulder grooving from bra straps and experiences frequent skin rashes in IMFs over the years treated multiple times with topical creams and both topical and oral antifungals. Recent mammogram 12/17/23 was WNL.   Interval hx (5/16/24). Pt presents today 3 months s/p extended panniculectomy. Already scheduled for BBR.

## 2024-05-16 NOTE — PHYSICAL EXAM
[Bra Size: _______] : Bra Size: [unfilled] [de-identified] : well-developed pleasant female, NAD [de-identified] : nonlabored breathing  [de-identified] : Bilateral breasts soft with superior deflation and Grade 4 ptosis, nipple sensation intact, no palpable nodules or LAD [de-identified] : soft, incision healing well, c/d/i, no erythema, umbilicus viable slightly high, excellent overall contour,  [de-identified] : prominent mons with skin laxity  [de-identified] : no edema or calf tenderness

## 2024-05-16 NOTE — DATA REVIEWED
[FreeTextEntry1] : [place] Accession Number : [id number] Patient:     PING GORE   Accession:                             [id number]  Collected Date/Time:                   2/26/2024 11:35 EST Received Date/Time:                    2/26/2024 11:45 EST  Surgical Pathology Report - Auth (Verified)  Specimen(s) Submitted Abdominal pannus  Final Diagnosis  Abdominal pannus, excision: - Skin and underlying fibroadipose tissue with no significant pathologic findings  Verified by: Pravin Benavides MD (Electronic Signature) Reported on: 03/04/24 15:56 EST, [facility], [facility], [address] [place] [address] Histology technical processing performed at [facility], [address] [place] [address] Phone: [phone]   Fax: [phone] _________________________________________________________________   Clinical Information Panniculectomy  Perioperative Diagnosis Weight loss  Postoperative Diagnosis Weight loss  Gross Description The specimen received fresh is labeled "abdominal pannus". The specimen  consists of a butterfly-shaped excision, measuring 40 x 30 x 3 cm of skin and subcutaneous tissue and 2 separate fragments, measuring 25 x 15 x 1.5 cm aggregate of skin and subcutaneous tissue.  The specimen weighs 4310 g in aggregate. Sectioning reveals no obvious lesions. Representative sections are submitted. (3 blocks)  02/28/2024 08:52:42 EST    OC

## 2024-05-30 ENCOUNTER — OUTPATIENT (OUTPATIENT)
Dept: OUTPATIENT SERVICES | Facility: HOSPITAL | Age: 59
LOS: 1 days | End: 2024-05-30
Payer: MEDICARE

## 2024-05-30 VITALS
DIASTOLIC BLOOD PRESSURE: 77 MMHG | SYSTOLIC BLOOD PRESSURE: 143 MMHG | TEMPERATURE: 98 F | HEART RATE: 72 BPM | WEIGHT: 189.6 LBS | HEIGHT: 63 IN | OXYGEN SATURATION: 100 % | RESPIRATION RATE: 18 BRPM

## 2024-05-30 DIAGNOSIS — Z86.018 PERSONAL HISTORY OF OTHER BENIGN NEOPLASM: Chronic | ICD-10-CM

## 2024-05-30 DIAGNOSIS — N62 HYPERTROPHY OF BREAST: ICD-10-CM

## 2024-05-30 DIAGNOSIS — Z98.890 OTHER SPECIFIED POSTPROCEDURAL STATES: Chronic | ICD-10-CM

## 2024-05-30 DIAGNOSIS — Z98.891 HISTORY OF UTERINE SCAR FROM PREVIOUS SURGERY: Chronic | ICD-10-CM

## 2024-05-30 DIAGNOSIS — Z87.19 PERSONAL HISTORY OF OTHER DISEASES OF THE DIGESTIVE SYSTEM: Chronic | ICD-10-CM

## 2024-05-30 DIAGNOSIS — Z01.818 ENCOUNTER FOR OTHER PREPROCEDURAL EXAMINATION: ICD-10-CM

## 2024-05-30 LAB
ALBUMIN SERPL ELPH-MCNC: 4.2 G/DL — SIGNIFICANT CHANGE UP (ref 3.5–5.2)
ALP SERPL-CCNC: 138 U/L — HIGH (ref 30–115)
ALT FLD-CCNC: 26 U/L — SIGNIFICANT CHANGE UP (ref 0–41)
ANION GAP SERPL CALC-SCNC: 11 MMOL/L — SIGNIFICANT CHANGE UP (ref 7–14)
AST SERPL-CCNC: 26 U/L — SIGNIFICANT CHANGE UP (ref 0–41)
BASOPHILS # BLD AUTO: 0.05 K/UL — SIGNIFICANT CHANGE UP (ref 0–0.2)
BASOPHILS NFR BLD AUTO: 1.1 % — HIGH (ref 0–1)
BILIRUB SERPL-MCNC: 0.3 MG/DL — SIGNIFICANT CHANGE UP (ref 0.2–1.2)
BUN SERPL-MCNC: 15 MG/DL — SIGNIFICANT CHANGE UP (ref 10–20)
CALCIUM SERPL-MCNC: 9 MG/DL — SIGNIFICANT CHANGE UP (ref 8.4–10.5)
CHLORIDE SERPL-SCNC: 104 MMOL/L — SIGNIFICANT CHANGE UP (ref 98–110)
CO2 SERPL-SCNC: 26 MMOL/L — SIGNIFICANT CHANGE UP (ref 17–32)
CREAT SERPL-MCNC: 0.7 MG/DL — SIGNIFICANT CHANGE UP (ref 0.7–1.5)
EGFR: 99 ML/MIN/1.73M2 — SIGNIFICANT CHANGE UP
EOSINOPHIL # BLD AUTO: 0.25 K/UL — SIGNIFICANT CHANGE UP (ref 0–0.7)
EOSINOPHIL NFR BLD AUTO: 5.3 % — SIGNIFICANT CHANGE UP (ref 0–8)
GLUCOSE SERPL-MCNC: 83 MG/DL — SIGNIFICANT CHANGE UP (ref 70–99)
HCT VFR BLD CALC: 37.6 % — SIGNIFICANT CHANGE UP (ref 37–47)
HGB BLD-MCNC: 12.1 G/DL — SIGNIFICANT CHANGE UP (ref 12–16)
IMM GRANULOCYTES NFR BLD AUTO: 0.2 % — SIGNIFICANT CHANGE UP (ref 0.1–0.3)
LYMPHOCYTES # BLD AUTO: 1.83 K/UL — SIGNIFICANT CHANGE UP (ref 1.2–3.4)
LYMPHOCYTES # BLD AUTO: 38.9 % — SIGNIFICANT CHANGE UP (ref 20.5–51.1)
MCHC RBC-ENTMCNC: 28.1 PG — SIGNIFICANT CHANGE UP (ref 27–31)
MCHC RBC-ENTMCNC: 32.2 G/DL — SIGNIFICANT CHANGE UP (ref 32–37)
MCV RBC AUTO: 87.2 FL — SIGNIFICANT CHANGE UP (ref 81–99)
MONOCYTES # BLD AUTO: 0.36 K/UL — SIGNIFICANT CHANGE UP (ref 0.1–0.6)
MONOCYTES NFR BLD AUTO: 7.6 % — SIGNIFICANT CHANGE UP (ref 1.7–9.3)
NEUTROPHILS # BLD AUTO: 2.21 K/UL — SIGNIFICANT CHANGE UP (ref 1.4–6.5)
NEUTROPHILS NFR BLD AUTO: 46.9 % — SIGNIFICANT CHANGE UP (ref 42.2–75.2)
NRBC # BLD: 0 /100 WBCS — SIGNIFICANT CHANGE UP (ref 0–0)
PLATELET # BLD AUTO: 294 K/UL — SIGNIFICANT CHANGE UP (ref 130–400)
PMV BLD: 10.6 FL — HIGH (ref 7.4–10.4)
POTASSIUM SERPL-MCNC: 4.9 MMOL/L — SIGNIFICANT CHANGE UP (ref 3.5–5)
POTASSIUM SERPL-SCNC: 4.9 MMOL/L — SIGNIFICANT CHANGE UP (ref 3.5–5)
PROT SERPL-MCNC: 6.4 G/DL — SIGNIFICANT CHANGE UP (ref 6–8)
RBC # BLD: 4.31 M/UL — SIGNIFICANT CHANGE UP (ref 4.2–5.4)
RBC # FLD: 13.5 % — SIGNIFICANT CHANGE UP (ref 11.5–14.5)
SODIUM SERPL-SCNC: 141 MMOL/L — SIGNIFICANT CHANGE UP (ref 135–146)
WBC # BLD: 4.71 K/UL — LOW (ref 4.8–10.8)
WBC # FLD AUTO: 4.71 K/UL — LOW (ref 4.8–10.8)

## 2024-05-30 PROCEDURE — 80053 COMPREHEN METABOLIC PANEL: CPT

## 2024-05-30 PROCEDURE — 99214 OFFICE O/P EST MOD 30 MIN: CPT | Mod: 25

## 2024-05-30 PROCEDURE — 85025 COMPLETE CBC W/AUTO DIFF WBC: CPT

## 2024-05-30 PROCEDURE — 36415 COLL VENOUS BLD VENIPUNCTURE: CPT

## 2024-05-30 NOTE — H&P PST ADULT - NSICDXPASTMEDICALHX_GEN_ALL_CORE_FT
PAST MEDICAL HISTORY:  Anxiety and depression     Back pain     Chronic fatigue     H/O fibromyalgia     History of seizure disorder     Hypertension     Hypothyroidism

## 2024-05-30 NOTE — H&P PST ADULT - HISTORY OF PRESENT ILLNESS
Patient is a 58 year old female presenting to PAST in preparation for BILATERAL BREAST REDUCTION WITH FREE NIPPLE GRAFT n __on 6/12 under gen anesthesisa by Dr arias  pt reports h/o back pain and many fungal infections underneath her breasts having above procedure  PATIENT CURRENTLY DENIES CHEST PAIN  SHORTNESS OF BREATH  PALPITATIONS,  DYSURIA, OR UPPER RESPIRATORY INFECTION IN PAST 2 WEEKS    Anesthesia Alert  NO--Difficult Airway  NO--History of neck surgery or radiation  NO--Limited ROM of neck  NO--History of Malignant hyperthermia  NO--Personal or family history of Pseudocholinesterase deficiency  NO--Prior Anesthesia Complication  NO--Latex Allergy  NO--Loose teeth  NO--History of Rheumatoid Arthritis  NO--VY  NO-- BLEEDING RISK  NO--Other_____      Duke Activity Status Index (DASI) from [employer]  on 5/30/2024  ** All calculations should be rechecked by clinician prior to use **    RESULT SUMMARY:  9.95 points  The higher the score (maximum 58.2), the higher the functional status.    3.97 METs        INPUTS:  Take care of self —> 2.75 = Yes  Walk indoors —> 1.75 = Yes  Walk 1&ndash;2 blocks on level ground —> 2.75 = Yes  Climb a flight of stairs or walk up a hill —> 0 = No  Run a short distance —> 0 = No  Do light work around the house —> 2.7 = Yes  Do moderate work around the house —> 0 = No  Do heavy work around the house —> 0 = No  Do yardwork —> 0 = No  Have sexual relations —> 0 = No  Participate in moderate recreational activities —> 0 = No  Participate in strenuous sports —> 0 = No    reports h/o back pain and sob from the back pain      Revised Cardiac Risk Index for Pre-Operative Risk from [facility]  on 5/30/2024      RESULT SUMMARY:  0 points  Class I Risk    3.9 %  30-day risk of death, MI, or cardiac arrest    From [employer] 2017. These numbers are higher than those from the original study (Asif 1999). See Evidence for details.      INPUTS:  Elevated-risk surgery —> 0 = No  History of ischemic heart disease —> 0 = No  History of congestive heart failure —> 0 = No  History of cerebrovascular disease —> 0 = No  Pre-operative treatment with insulin —> 0 = No  Pre-operative creatinine >2 mg/dL / 176.8 µmol/L —> 0 = No        As per patient, this is their complete medical and surgical history, including medications both prescribed or over the counter.  Patient verbalized understanding of instructions and was given the opportunity to ask questions and have them answered.   Patient is a 58 year old female presenting to PAST in preparation for BILATERAL BREAST REDUCTION WITH FREE NIPPLE GRAFT on 6/12 under gen anesthesia by Dr arias  pt reports h/o back pain and many fungal infections underneath her breasts having above procedure has weight loss of 150#s and breast tissue is at an excess  PATIENT CURRENTLY DENIES CHEST PAIN  SHORTNESS OF BREATH  PALPITATIONS,  DYSURIA, OR UPPER RESPIRATORY INFECTION IN PAST 2 WEEKS    Anesthesia Alert  NO--Difficult Airway  NO--History of neck surgery or radiation  NO--Limited ROM of neck  NO--History of Malignant hyperthermia  NO--Personal or family history of Pseudocholinesterase deficiency  NO--Prior Anesthesia Complication  NO--Latex Allergy  NO--Loose teeth  NO--History of Rheumatoid Arthritis  NO--VY  NO-- BLEEDING RISK  NO--Other_____      Duke Activity Status Index (DASI) from [employer]  on 5/30/2024  ** All calculations should be rechecked by clinician prior to use **    RESULT SUMMARY:  9.95 points  The higher the score (maximum 58.2), the higher the functional status.    3.97 METs        INPUTS:  Take care of self —> 2.75 = Yes  Walk indoors —> 1.75 = Yes  Walk 1&ndash;2 blocks on level ground —> 2.75 = Yes  Climb a flight of stairs or walk up a hill —> 0 = No  Run a short distance —> 0 = No  Do light work around the house —> 2.7 = Yes  Do moderate work around the house —> 0 = No  Do heavy work around the house —> 0 = No  Do yardwork —> 0 = No  Have sexual relations —> 0 = No  Participate in moderate recreational activities —> 0 = No  Participate in strenuous sports —> 0 = No    reports h/o back pain and sob from the back pain      Revised Cardiac Risk Index for Pre-Operative Risk from [facility]  on 5/30/2024      RESULT SUMMARY:  0 points  Class I Risk    3.9 %  30-day risk of death, MI, or cardiac arrest    From [employer] 2017. These numbers are higher than those from the original study (Asif 1999). See Evidence for details.      INPUTS:  Elevated-risk surgery —> 0 = No  History of ischemic heart disease —> 0 = No  History of congestive heart failure —> 0 = No  History of cerebrovascular disease —> 0 = No  Pre-operative treatment with insulin —> 0 = No  Pre-operative creatinine >2 mg/dL / 176.8 µmol/L —> 0 = No        As per patient, this is their complete medical and surgical history, including medications both prescribed or over the counter.  Patient verbalized understanding of instructions and was given the opportunity to ask questions and have them answered.

## 2024-05-31 DIAGNOSIS — Z01.818 ENCOUNTER FOR OTHER PREPROCEDURAL EXAMINATION: ICD-10-CM

## 2024-05-31 DIAGNOSIS — N62 HYPERTROPHY OF BREAST: ICD-10-CM

## 2024-06-05 RX ORDER — GABAPENTIN 300 MG/1
300 CAPSULE ORAL
Qty: 7 | Refills: 0 | Status: ACTIVE | COMMUNITY
Start: 2024-06-05 | End: 1900-01-01

## 2024-06-12 ENCOUNTER — APPOINTMENT (OUTPATIENT)
Dept: PLASTIC SURGERY | Facility: AMBULATORY SURGERY CENTER | Age: 59
End: 2024-06-12
Payer: MEDICARE

## 2024-06-12 ENCOUNTER — OUTPATIENT (OUTPATIENT)
Dept: OUTPATIENT SERVICES | Facility: HOSPITAL | Age: 59
LOS: 1 days | Discharge: HOME | End: 2024-06-12
Payer: MEDICARE

## 2024-06-12 ENCOUNTER — RESULT REVIEW (OUTPATIENT)
Age: 59
End: 2024-06-12

## 2024-06-12 ENCOUNTER — TRANSCRIPTION ENCOUNTER (OUTPATIENT)
Age: 59
End: 2024-06-12

## 2024-06-12 VITALS
HEART RATE: 62 BPM | WEIGHT: 189.6 LBS | DIASTOLIC BLOOD PRESSURE: 80 MMHG | HEIGHT: 63 IN | OXYGEN SATURATION: 100 % | SYSTOLIC BLOOD PRESSURE: 133 MMHG | RESPIRATION RATE: 20 BRPM | TEMPERATURE: 98 F

## 2024-06-12 VITALS
DIASTOLIC BLOOD PRESSURE: 68 MMHG | RESPIRATION RATE: 14 BRPM | SYSTOLIC BLOOD PRESSURE: 114 MMHG | OXYGEN SATURATION: 96 % | HEART RATE: 70 BPM

## 2024-06-12 DIAGNOSIS — Z90.89 ACQUIRED ABSENCE OF OTHER ORGANS: Chronic | ICD-10-CM

## 2024-06-12 DIAGNOSIS — Z86.018 PERSONAL HISTORY OF OTHER BENIGN NEOPLASM: Chronic | ICD-10-CM

## 2024-06-12 DIAGNOSIS — Z87.19 PERSONAL HISTORY OF OTHER DISEASES OF THE DIGESTIVE SYSTEM: Chronic | ICD-10-CM

## 2024-06-12 DIAGNOSIS — N62 HYPERTROPHY OF BREAST: ICD-10-CM

## 2024-06-12 DIAGNOSIS — L98.7 EXCESSIVE AND REDUNDANT SKIN AND SUBCUTANEOUS TISSUE: ICD-10-CM

## 2024-06-12 DIAGNOSIS — Z98.891 HISTORY OF UTERINE SCAR FROM PREVIOUS SURGERY: Chronic | ICD-10-CM

## 2024-06-12 DIAGNOSIS — Z98.890 OTHER SPECIFIED POSTPROCEDURAL STATES: Chronic | ICD-10-CM

## 2024-06-12 PROCEDURE — 88305 TISSUE EXAM BY PATHOLOGIST: CPT | Mod: 26

## 2024-06-12 PROCEDURE — 88305 TISSUE EXAM BY PATHOLOGIST: CPT

## 2024-06-12 PROCEDURE — 19318 BREAST REDUCTION: CPT | Mod: 50

## 2024-06-12 RX ORDER — TIRZEPATIDE 15 MG/.5ML
10 INJECTION, SOLUTION SUBCUTANEOUS
Refills: 0 | DISCHARGE

## 2024-06-12 RX ORDER — CELECOXIB 200 MG/1
1 CAPSULE ORAL
Refills: 0 | DISCHARGE

## 2024-06-12 RX ORDER — MORPHINE SULFATE 50 MG/1
2 CAPSULE, EXTENDED RELEASE ORAL
Refills: 0 | Status: DISCONTINUED | OUTPATIENT
Start: 2024-06-12 | End: 2024-06-12

## 2024-06-12 RX ORDER — HYDROMORPHONE HYDROCHLORIDE 2 MG/ML
0.5 INJECTION INTRAMUSCULAR; INTRAVENOUS; SUBCUTANEOUS
Refills: 0 | Status: DISCONTINUED | OUTPATIENT
Start: 2024-06-12 | End: 2024-06-12

## 2024-06-12 RX ORDER — KETOROLAC TROMETHAMINE 30 MG/ML
30 SYRINGE (ML) INJECTION ONCE
Refills: 0 | Status: DISCONTINUED | OUTPATIENT
Start: 2024-06-12 | End: 2024-06-12

## 2024-06-12 RX ORDER — TRAMADOL HYDROCHLORIDE 50 MG/1
1 TABLET ORAL
Qty: 16 | Refills: 0
Start: 2024-06-12 | End: 2024-06-15

## 2024-06-12 RX ORDER — OFLOXACIN 0.3 %
1 DROPS OPHTHALMIC (EYE) EVERY 6 HOURS
Refills: 0 | Status: DISCONTINUED | OUTPATIENT
Start: 2024-06-12 | End: 2024-06-12

## 2024-06-12 RX ORDER — HYDROMORPHONE HYDROCHLORIDE 2 MG/ML
1 INJECTION INTRAMUSCULAR; INTRAVENOUS; SUBCUTANEOUS
Refills: 0 | Status: DISCONTINUED | OUTPATIENT
Start: 2024-06-12 | End: 2024-06-12

## 2024-06-12 RX ORDER — TOPIRAMATE 25 MG
1 TABLET ORAL
Refills: 0 | DISCHARGE

## 2024-06-12 RX ORDER — FLUOXETINE HCL 10 MG
2 CAPSULE ORAL
Refills: 0 | DISCHARGE

## 2024-06-12 RX ORDER — SODIUM CHLORIDE 9 MG/ML
1000 INJECTION, SOLUTION INTRAVENOUS
Refills: 0 | Status: DISCONTINUED | OUTPATIENT
Start: 2024-06-12 | End: 2024-06-12

## 2024-06-12 RX ORDER — LAMOTRIGINE 25 MG/1
1 TABLET, ORALLY DISINTEGRATING ORAL
Refills: 0 | DISCHARGE

## 2024-06-12 RX ORDER — OXYCODONE AND ACETAMINOPHEN 5; 325 MG/1; MG/1
1 TABLET ORAL EVERY 4 HOURS
Refills: 0 | Status: DISCONTINUED | OUTPATIENT
Start: 2024-06-12 | End: 2024-06-12

## 2024-06-12 RX ORDER — PANTOPRAZOLE SODIUM 20 MG/1
1 TABLET, DELAYED RELEASE ORAL
Refills: 0 | DISCHARGE

## 2024-06-12 RX ORDER — SUCRALFATE 1 G
1 TABLET ORAL
Refills: 0 | DISCHARGE

## 2024-06-12 RX ORDER — LEVOTHYROXINE SODIUM 125 MCG
1 TABLET ORAL
Refills: 0 | DISCHARGE

## 2024-06-12 RX ORDER — ONDANSETRON 8 MG/1
4 TABLET, FILM COATED ORAL ONCE
Refills: 0 | Status: DISCONTINUED | OUTPATIENT
Start: 2024-06-12 | End: 2024-06-12

## 2024-06-12 RX ORDER — ONDANSETRON 8 MG/1
1 TABLET, FILM COATED ORAL
Qty: 1 | Refills: 0
Start: 2024-06-12

## 2024-06-12 RX ADMIN — SODIUM CHLORIDE 100 MILLILITER(S): 9 INJECTION, SOLUTION INTRAVENOUS at 13:35

## 2024-06-12 RX ADMIN — HYDROMORPHONE HYDROCHLORIDE 0.5 MILLIGRAM(S): 2 INJECTION INTRAMUSCULAR; INTRAVENOUS; SUBCUTANEOUS at 14:20

## 2024-06-12 RX ADMIN — HYDROMORPHONE HYDROCHLORIDE 0.5 MILLIGRAM(S): 2 INJECTION INTRAMUSCULAR; INTRAVENOUS; SUBCUTANEOUS at 16:20

## 2024-06-12 RX ADMIN — MORPHINE SULFATE 2 MILLIGRAM(S): 50 CAPSULE, EXTENDED RELEASE ORAL at 14:12

## 2024-06-12 RX ADMIN — MORPHINE SULFATE 2 MILLIGRAM(S): 50 CAPSULE, EXTENDED RELEASE ORAL at 13:34

## 2024-06-12 NOTE — ASU DISCHARGE PLAN (ADULT/PEDIATRIC) - PAIN MANAGEMENT
Doxycycline, Zofran for nausea, Tramadol for severe pain/Take over the counter pain medication/Prescriptions electronically submitted to pharmacy from [facility]

## 2024-06-12 NOTE — ASU DISCHARGE PLAN (ADULT/PEDIATRIC) - CARE PROVIDER_API CALL
Romario Gilliland  Plastic Surgery  [address] [place] [id number]  Phone: [phone]  Fax: [phone]  Follow Up Time:

## 2024-06-12 NOTE — ASU DISCHARGE PLAN (ADULT/PEDIATRIC) - ASU DC SPECIAL INSTRUCTIONSFT
Keep surgical site clean and dry.   Do not remove any dressings or get them wet.   Sleep on your back with head elevated to reduce swelling.   Wear surgical bra at all times.   Empty and strip drains and record output twice daily as instructed.   Rest, no lifting.

## 2024-06-12 NOTE — ASU DISCHARGE PLAN (ADULT/PEDIATRIC) - MEDICATION INSTRUCTIONS
take extra strength Tylenol every 6 hours, Gabapentin twice daily starting tonight and Tramadol for severe pain

## 2024-06-12 NOTE — ASU DISCHARGE PLAN (ADULT/PEDIATRIC) - NS MD DC FALL RISK RISK
For information on Fall & Injury Prevention, visit: [email or web address] OR  [email or web address] OR  https://www.cdc.gov/steadi/patient.html

## 2024-06-13 ENCOUNTER — NON-APPOINTMENT (OUTPATIENT)
Age: 59
End: 2024-06-13

## 2024-06-13 RX ORDER — OXYCODONE AND ACETAMINOPHEN 5; 325 MG/1; MG/1
5-325 TABLET ORAL
Qty: 10 | Refills: 0 | Status: ACTIVE | COMMUNITY
Start: 2024-06-13 | End: 1900-01-01

## 2024-06-14 LAB — SURGICAL PATHOLOGY STUDY: SIGNIFICANT CHANGE UP

## 2024-06-17 DIAGNOSIS — F41.9 ANXIETY DISORDER, UNSPECIFIED: ICD-10-CM

## 2024-06-17 DIAGNOSIS — N62 HYPERTROPHY OF BREAST: ICD-10-CM

## 2024-06-17 DIAGNOSIS — Z79.85 LONG-TERM (CURRENT) USE OF INJECTABLE NON-INSULIN ANTIDIABETIC DRUGS: ICD-10-CM

## 2024-06-17 DIAGNOSIS — Z88.1 ALLERGY STATUS TO OTHER ANTIBIOTIC AGENTS STATUS: ICD-10-CM

## 2024-06-17 DIAGNOSIS — G40.909 EPILEPSY, UNSPECIFIED, NOT INTRACTABLE, WITHOUT STATUS EPILEPTICUS: ICD-10-CM

## 2024-06-17 DIAGNOSIS — I10 ESSENTIAL (PRIMARY) HYPERTENSION: ICD-10-CM

## 2024-06-17 DIAGNOSIS — E03.9 HYPOTHYROIDISM, UNSPECIFIED: ICD-10-CM

## 2024-06-17 DIAGNOSIS — Z98.84 BARIATRIC SURGERY STATUS: ICD-10-CM

## 2024-06-17 DIAGNOSIS — Z87.891 PERSONAL HISTORY OF NICOTINE DEPENDENCE: ICD-10-CM

## 2024-06-17 DIAGNOSIS — R63.4 ABNORMAL WEIGHT LOSS: ICD-10-CM

## 2024-06-17 DIAGNOSIS — N64.2 ATROPHY OF BREAST: ICD-10-CM

## 2024-06-17 DIAGNOSIS — F32.A DEPRESSION, UNSPECIFIED: ICD-10-CM

## 2024-06-19 ENCOUNTER — APPOINTMENT (OUTPATIENT)
Dept: PLASTIC SURGERY | Facility: CLINIC | Age: 59
End: 2024-06-19
Payer: MEDICARE

## 2024-06-19 PROBLEM — M54.9 DORSALGIA, UNSPECIFIED: Chronic | Status: ACTIVE | Noted: 2024-05-30

## 2024-06-19 PROCEDURE — 99024 POSTOP FOLLOW-UP VISIT: CPT

## 2024-06-19 NOTE — PHYSICAL EXAM
[de-identified] : well-developed pleasant female, NAD [de-identified] : nonlabored breathing  [de-identified] : Bilateral breasts soft with resolving ecchymosis and swelling as expected. B/l nipples viable. Wise pattern incisions are CDI, no open areas or drainage. No erythema or cellulitis. R breast with tenderness lateral to NAC, no palpable fluid. collection. R breast with erythematous rash at most lateral aspect of incision and scattered areas of irritation 2/2 bandage.  Both drains functional.  [de-identified] : soft, incision healing well, c/d/i, no erythema, umbilicus viable slightly high, excellent overall contour,  [de-identified] : prominent mons with skin laxity  [de-identified] : no edema or calf tenderness

## 2024-06-19 NOTE — ASSESSMENT
[FreeTextEntry1] : 60 yo F with h/o MWL s/p bariatric surgery s/p extended panniculectomy Feb 2024.  Interetsed in breast reduction, b/l brachioplasty and mons lift.   Pt is POD# 7 s/p bilateral breast reduction. Doing well   - Given R breast tenderness and higher output of L breast drain, both left in place. Pt will continue to strip daily and monitor output  - All bandages changed, steri strips placed - signs and symptoms of infection reviewed, complete course of abx - No heavy lifting/pushing/pulling > 10lbs until 6 weeks from surgery - No cardiovascular activity / inc heart rate until 3 weeks from surgery - No showering / getting drain sites wet  - Continue compression bra, abd pads placed with much relief - Breast pathology reviewed: R breast (benign tissue, 426gm), L breast (benign tissue, 523gm) - All post op instructions reviewed, all questions answered - f/u early next week for drain removal

## 2024-06-19 NOTE — HISTORY OF PRESENT ILLNESS
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 Patient reports history of Lap Band in 2009 with Dr. Adam in [facility], s/p band revision & hernia repair (no mesh) in 2017 and then conversion to gastric bypass in March 2022 with . Pt states nausea/vomiting resolved. Pt still follows up with  office, was started on Ozempic 1 year ago which prompted an additional 60lbs. Pt with overall 150lbs weight loss (348lbs-->196lbs). Weight stable within 5-10lbs for approximately 3 months, BMI 34.9. Pt had nutritional labs done recently, does not have a copy, unsure of any deficiencies. Pt endorses h/o severe rashes and irritation under both her breast and her abd pannus, pt has used anti-fungal powder and OTC ointments for over 6 months without improvement. Pt has severe bra strap grooving as well. Overhanging abdominal pannus affects low self esteem and daily interactions. Pt states that she has tremendous difficulty walking, to the point she has felt bed bound at times. States her pannus has affected her ability to get in and out of the shower, making cleanliness difficult. Patient reports all over back pain secondary to weight of pannus, pt received her 1st spinal shot by  (in [place]) last week, without any relief yet. Pt also went to physical therapy for over 6 months without any relief.  Pt was on high dose pain medication (fentanyl, oxycodone, more) however  stopped all of them and plans on prescribing 1 narcotic to decrease r/o dependency. Pt is due for a mammo, her last one 1 year ago was WNL. Denies any personal or family h/o breast cancer.  Desires surgical correction for both her breasts and abdominal pannus. No other pertinent medical or surgical history.  Pt has supporting letters from multiple doctors recommended panniculectomy/ breast reduction surgery. Ortho states pain is NOT 2/2 spinal issues, derm noted h/o chronic rashes since 2017, etc. Current bra size: 42F Desires size: as small as possible  Former smoker, quit 10 years ago. Smokes sometimes, last cigarette was 5M ago. Occ: On disability x10 years for chronic pain issues Previously saw  however very unhappy  Interval hx 2/15/24: here for questions prior to surgery on 2/26, concerned with area of mons  Interval hx (3/5/24). Pt here POD#8 s/p extended panniculectomy. Doing very well with improving incisional discomfort. Ambulating. Taking all prescribed medications. Denies any f/c or bleeding. Drains functional.   Interval hx (3/8/24). Pt here POD#11 s/p extended panniculectomy. Pt called reporting that drain stitch fell out and with leaking around drain site. States drain is still functional with >30cc ouput x24 hrs & collapsed bulb. Here for evaluation.   Interval hx (3/14/24). Pt here POD#17 s/p extended panniculectomy. Doing well and happy with results. Denies any f/c or drainage. Concerned with prominent mons and positioning of umbilicus. Drains both functional.   Interval hx (3/19/24). Pt presents today POD#22 s/p extended panniculectomy. Doing well and happy with results. Offers no new complaints but questioning prominent mons and positioning of umbilicus. Drain functional.   Interval hx (4/16/24). Pt presents today 2 months s/p extended panniculectomy. Doing well but concerned with prominent mons and residual skin laxity. She is also interested in bilateral breast reduction. She is currently 42F cup size and would like to be a C-cup. She c/o heavy deflated breasts causing neck and back pain, has deep shoulder grooving from bra straps and experiences frequent skin rashes in IMFs over the years treated multiple times with topical creams and both topical and oral antifungals. Recent mammogram 12/17/23 was WNL.   Interval hx (5/16/24). Pt presents today 3 months s/p extended panniculectomy. Already scheduled for BBR.   Interval hx (6/19/24): Pt is POD# 7 s/p bilateral breast reduction. Here for her first post op visit. Pt denies fever/chills/cp/SOB, LE pain or swelling. Ambulating. Wearing compression bra with a T shirt underneath due to discomfort / bra rolling up. B/l drains in place and functional, L drain with 20-30cc SS fluid, R drain with 10-15cc fluid however states bulb not staying collapsed.

## 2024-06-21 ENCOUNTER — APPOINTMENT (OUTPATIENT)
Dept: PLASTIC SURGERY | Facility: CLINIC | Age: 59
End: 2024-06-21
Payer: MEDICARE

## 2024-06-21 DIAGNOSIS — B37.0 CANDIDAL STOMATITIS: ICD-10-CM

## 2024-06-21 PROCEDURE — 99024 POSTOP FOLLOW-UP VISIT: CPT

## 2024-06-21 RX ORDER — NYSTATIN 100000 [USP'U]/ML
100000 SUSPENSION ORAL 3 TIMES DAILY
Qty: 1 | Refills: 0 | Status: ACTIVE | COMMUNITY
Start: 2024-06-21 | End: 1900-01-01

## 2024-06-21 NOTE — HISTORY OF PRESENT ILLNESS
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 Patient reports history of Lap Band in 2009 with Dr. Adam in [facility], s/p band revision & hernia repair (no mesh) in 2017 and then conversion to gastric bypass in March 2022 with . Pt states nausea/vomiting resolved. Pt still follows up with  office, was started on Ozempic 1 year ago which prompted an additional 60lbs. Pt with overall 150lbs weight loss (348lbs-->196lbs). Weight stable within 5-10lbs for approximately 3 months, BMI 34.9. Pt had nutritional labs done recently, does not have a copy, unsure of any deficiencies. Pt endorses h/o severe rashes and irritation under both her breast and her abd pannus, pt has used anti-fungal powder and OTC ointments for over 6 months without improvement. Pt has severe bra strap grooving as well. Overhanging abdominal pannus affects low self esteem and daily interactions. Pt states that she has tremendous difficulty walking, to the point she has felt bed bound at times. States her pannus has affected her ability to get in and out of the shower, making cleanliness difficult. Patient reports all over back pain secondary to weight of pannus, pt received her 1st spinal shot by  (in [place]) last week, without any relief yet. Pt also went to physical therapy for over 6 months without any relief.  Pt was on high dose pain medication (fentanyl, oxycodone, more) however  stopped all of them and plans on prescribing 1 narcotic to decrease r/o dependency. Pt is due for a mammo, her last one 1 year ago was WNL. Denies any personal or family h/o breast cancer.  Desires surgical correction for both her breasts and abdominal pannus. No other pertinent medical or surgical history.  Pt has supporting letters from multiple doctors recommended panniculectomy/ breast reduction surgery. Ortho states pain is NOT 2/2 spinal issues, derm noted h/o chronic rashes since 2017, etc. Current bra size: 42F Desires size: as small as possible  Former smoker, quit 10 years ago. Smokes sometimes, last cigarette was 5M ago. Occ: On disability x10 years for chronic pain issues Previously saw  however very unhappy  Interval hx 2/15/24: here for questions prior to surgery on 2/26, concerned with area of mons  Interval hx (3/5/24). Pt here POD#8 s/p extended panniculectomy. Doing very well with improving incisional discomfort. Ambulating. Taking all prescribed medications. Denies any f/c or bleeding. Drains functional.   Interval hx (3/8/24). Pt here POD#11 s/p extended panniculectomy. Pt called reporting that drain stitch fell out and with leaking around drain site. States drain is still functional with >30cc ouput x24 hrs & collapsed bulb. Here for evaluation.   Interval hx (3/14/24). Pt here POD#17 s/p extended panniculectomy. Doing well and happy with results. Denies any f/c or drainage. Concerned with prominent mons and positioning of umbilicus. Drains both functional.   Interval hx (3/19/24). Pt presents today POD#22 s/p extended panniculectomy. Doing well and happy with results. Offers no new complaints but questioning prominent mons and positioning of umbilicus. Drain functional.   Interval hx (4/16/24). Pt presents today 2 months s/p extended panniculectomy. Doing well but concerned with prominent mons and residual skin laxity. She is also interested in bilateral breast reduction. She is currently 42F cup size and would like to be a C-cup. She c/o heavy deflated breasts causing neck and back pain, has deep shoulder grooving from bra straps and experiences frequent skin rashes in IMFs over the years treated multiple times with topical creams and both topical and oral antifungals. Recent mammogram 12/17/23 was WNL.   Interval hx (5/16/24). Pt presents today 3 months s/p extended panniculectomy. Already scheduled for BBR.   Interval hx (6/19/24): Pt is POD# 7 s/p bilateral breast reduction. Here for her first post op visit. Pt denies fever/chills/cp/SOB, LE pain or swelling. Ambulating. Wearing compression bra with a T shirt underneath due to discomfort / bra rolling up. B/l drains in place and functional, L drain with 20-30cc SS fluid, R drain with 10-15cc fluid however states bulb not staying collapsed.   Interval hx (6/21/24): Pt is POD# 9 s/p bilateral breast reduction. Pt is doing well, denies f/c/drainage from any sites. Both drains with <15ccs daily since last visit. Wearing surgical bra with a t shirt underneath. ambulating frequently. Taking extra strength tylenol and tramadol only at night. Pt c/o pain and tingeling in her mouth, concerned for thrush after abx.

## 2024-06-21 NOTE — DATA REVIEWED
[FreeTextEntry1] : [place] Accession Number : [id number] Patient:     PING GORE   Accession:                             [id number]  Collected Date/Time:                   2/26/2024 11:35 EST Received Date/Time:                    2/26/2024 11:45 EST  Surgical Pathology Report - Auth (Verified)  Specimen(s) Submitted Abdominal pannus  Final Diagnosis  Abdominal pannus, excision: - Skin and underlying fibroadipose tissue with no significant pathologic findings  Verified by: Pravin Benavides MD (Electronic Signature) Reported on: 03/04/24 15:56 EST, [facility], [facility], [address] [place] [address] Histology technical processing performed at [facility], [address] [place] [address] Phone: [phone]   Fax: [phone] _________________________________________________________________   Clinical Information Panniculectomy  Perioperative Diagnosis Weight loss  Postoperative Diagnosis Weight loss  Gross Description The specimen received fresh is labeled "abdominal pannus". The specimen  consists of a butterfly-shaped excision, measuring 40 x 30 x 3 cm of skin and subcutaneous tissue and 2 separate fragments, measuring 25 x 15 x 1.5 cm aggregate of skin and subcutaneous tissue.  The specimen weighs 4310 g in aggregate. Sectioning reveals no obvious lesions. Representative sections are submitted. (3 blocks)  02/28/2024 08:52:42 EST    OC

## 2024-06-21 NOTE — PHYSICAL EXAM
[de-identified] : well-developed pleasant female, NAD [de-identified] : Atraumatic, normocephalic  [de-identified] : Negative for white lesions or plaques in oral mucosa or tongue  [de-identified] : nonlabored breathing  [de-identified] : Bilateral breasts soft with resolving ecchymosis and swelling as expected. B/l nipples viable. Wise pattern incisions are CDI, no open areas or drainage. B/l T point prolene sutures in place. No erythema or cellulitis. R breast with tenderness lateral to NAC, no palpable fluid collection. R breast with erythematous rash at most lateral aspect of incision and scattered areas of irritation 2/2 bandage, improved since last visit.  Both drains functional with 5-10ccs SS fluid in bulb.  [de-identified] : soft, incision healing well, c/d/i, no erythema, umbilicus viable slightly high, excellent overall contour,  [de-identified] : prominent mons with skin laxity  [de-identified] : no edema or calf tenderness

## 2024-06-21 NOTE — ASSESSMENT
[FreeTextEntry1] : 60 yo F with h/o MWL s/p bariatric surgery s/p extended panniculectomy Feb 2024.  Interetsed in breast reduction, b/l brachioplasty and mons lift.   Pt is POD# 9 s/p bilateral breast reduction. Doing well. Both drains now meet criteria for removal   - d/c b/l drains, baci BID until closed - Keep steri strips on until they fall off on their own. Then start Aquaphor - signs and symptoms of infection reviewed, complete course of abx - Rx for oral thrush symptoms sent  - No heavy lifting/pushing/pulling > 10lbs until 6 weeks from surgery - No cardiovascular activity / inc heart rate until 3 weeks from surgery - Ok to shower - Extra strength Tylenol pRn  - Continue compression bra with abd padding  - Breast pathology reviewed: R breast (benign tissue, 426gm), L breast (benign tissue, 523gm) - All post op instructions reviewed, all questions answered -f/u next week for suture removal & scar management

## 2024-06-27 ENCOUNTER — APPOINTMENT (OUTPATIENT)
Dept: PLASTIC SURGERY | Facility: CLINIC | Age: 59
End: 2024-06-27
Payer: MEDICARE

## 2024-06-27 DIAGNOSIS — N62 HYPERTROPHY OF BREAST: ICD-10-CM

## 2024-06-27 PROCEDURE — 99024 POSTOP FOLLOW-UP VISIT: CPT

## 2024-08-06 ENCOUNTER — LABORATORY RESULT (OUTPATIENT)
Age: 59
End: 2024-08-06

## 2024-08-08 ENCOUNTER — APPOINTMENT (OUTPATIENT)
Dept: PLASTIC SURGERY | Facility: CLINIC | Age: 59
End: 2024-08-08

## 2024-08-08 PROCEDURE — 99212 OFFICE O/P EST SF 10 MIN: CPT | Mod: 24

## 2024-08-08 NOTE — ASSESSMENT
[FreeTextEntry1] : 58 yo F with h/o MWL s/p bariatric surgery s/p extended panniculectomy Feb 2024.  Interested in breast reduction, b/l brachioplasty and mons lift.   Now  POD#15 s/p bilateral breast reduction. Doing well.   - sutures removed, steri strips applied - daily Aquaphor then silicone-based products in 1-2 weeks  - No heavy lifting/pushing/pulling > 10lbs until 6 weeks from surgery - Continue compression bra with abd padding  - Breast pathology reviewed: R breast (benign tissue, 426gm), L breast (benign tissue, 523gm) - All post op instructions reviewed, all questions answered -f/u 6-8 weeks with Dr. Gilliland     8/8/2024 as above pt chewing ice--was sent for labs by Chantelle--iron found to be low (22--range ) pt to contact bariatric surgeon today for appropritae iron supplementation  pt concners w: -right lateral breast subtle fullness -mons -umbilicus--requests revmoal of umbilicus  I explained in detail the umbilcius was not moved during prior emmanuel castaneda--simply all the tissues around it were removed  all ?s answerweed  Photos taken with patient permission. f./u after iron issue addressed and repeat labs were done f/u Oct 2024 all ?s answered

## 2024-08-08 NOTE — DATA REVIEWED
[FreeTextEntry1] : [place] Accession Number : [id number] Patient:     PING GORE   Accession:                             [id number]  Collected Date/Time:                   2/26/2024 11:35 EST Received Date/Time:                    2/26/2024 11:45 EST  Surgical Pathology Report - Auth (Verified)  Specimen(s) Submitted Abdominal pannus  Final Diagnosis  Abdominal pannus, excision: - Skin and underlying fibroadipose tissue with no significant pathologic findings  Verified by: Pravin Benavides MD (Electronic Signature) Reported on: 03/04/24 15:56 EST, [facility], [facility], [address] [place] [address] Histology technical processing [place] [address] Phone: [phone]   Fax: [phone] _________________________________________________________________

## 2024-08-08 NOTE — HISTORY OF PRESENT ILLNESS
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 Patient reports history of Lap Band in 2009 with Dr. Adam in [facility], s/p band revision & hernia repair (no mesh) in 2017 and then conversion to gastric bypass in March 2022 with . Pt states nausea/vomiting resolved. Pt still follows up with  office, was started on Ozempic 1 year ago which prompted an additional 60lbs. Pt with overall 150lbs weight loss (348lbs-->196lbs). Weight stable within 5-10lbs for approximately 3 months, BMI 34.9. Pt had nutritional labs done recently, does not have a copy, unsure of any deficiencies. Pt endorses h/o severe rashes and irritation under both her breast and her abd pannus, pt has used anti-fungal powder and OTC ointments for over 6 months without improvement. Pt has severe bra strap grooving as well. Overhanging abdominal pannus affects low self esteem and daily interactions. Pt states that she has tremendous difficulty walking, to the point she has felt bed bound at times. States her pannus has affected her ability to get in and out of the shower, making cleanliness difficult. Patient reports all over back pain secondary to weight of pannus, pt received her 1st spinal shot by  (in [place]) last week, without any relief yet. Pt also went to physical therapy for over 6 months without any relief.  Pt was on high dose pain medication (fentanyl, oxycodone, more) however  stopped all of them and plans on prescribing 1 narcotic to decrease r/o dependency. Pt is due for a mammo, her last one 1 year ago was WNL. Denies any personal or family h/o breast cancer.  Desires surgical correction for both her breasts and abdominal pannus. No other pertinent medical or surgical history.  Pt has supporting letters from multiple doctors recommended panniculectomy/ breast reduction surgery. Ortho states pain is NOT 2/2 spinal issues, derm noted h/o chronic rashes since 2017, etc. Current bra size: 42F Desires size: as small as possible  Former smoker, quit 10 years ago. Smokes sometimes, last cigarette was 5M ago. Occ: On disability x10 years for chronic pain issues Previously saw  however very unhappy  Interval hx 2/15/24: here for questions prior to surgery on 2/26, concerned with area of mons  Interval hx (3/5/24). Pt here POD#8 s/p extended panniculectomy. Doing very well with improving incisional discomfort. Ambulating. Taking all prescribed medications. Denies any f/c or bleeding. Drains functional.   Interval hx (3/8/24). Pt here POD#11 s/p extended panniculectomy. Pt called reporting that drain stitch fell out and with leaking around drain site. States drain is still functional with >30cc ouput x24 hrs & collapsed bulb. Here for evaluation.   Interval hx (3/14/24). Pt here POD#17 s/p extended panniculectomy. Doing well and happy with results. Denies any f/c or drainage. Concerned with prominent mons and positioning of umbilicus. Drains both functional.   Interval hx (3/19/24). Pt presents today POD#22 s/p extended panniculectomy. Doing well and happy with results. Offers no new complaints but questioning prominent mons and positioning of umbilicus. Drain functional.   Interval hx (4/16/24). Pt presents today 2 months s/p extended panniculectomy. Doing well but concerned with prominent mons and residual skin laxity. She is also interested in bilateral breast reduction. She is currently 42F cup size and would like to be a C-cup. She c/o heavy deflated breasts causing neck and back pain, has deep shoulder grooving from bra straps and experiences frequent skin rashes in IMFs over the years treated multiple times with topical creams and both topical and oral antifungals. Recent mammogram 12/17/23 was WNL.   Interval hx (5/16/24). Pt presents today 3 months s/p extended panniculectomy. Already scheduled for BBR.   Interval hx (6/19/24): Pt is POD# 7 s/p bilateral breast reduction. Here for her first post op visit. Pt denies fever/chills/cp/SOB, LE pain or swelling. Ambulating. Wearing compression bra with a T shirt underneath due to discomfort / bra rolling up. B/l drains in place and functional, L drain with 20-30cc SS fluid, R drain with 10-15cc fluid however states bulb not staying collapsed.   Interval hx (6/21/24): Pt is POD# 9 s/p bilateral breast reduction. Pt is doing well, denies f/c/drainage from any sites. Both drains with <15ccs daily since last visit. Wearing surgical bra with a t shirt underneath. ambulating frequently. Taking extra strength tylenol and tramadol only at night. Pt c/o pain and tingling in her mouth, concerned for thrush after abx.   Interval hx (6/27/24): Pt presents today POD#15 s/p bilateral breast reduction. Doing well but concerned with shape of lateral breast R>L and scarring. Otherwise denies any f/c or bleeding.

## 2024-08-08 NOTE — PHYSICAL EXAM
[de-identified] : well-developed pleasant female, NAD [de-identified] : nonlabored breathing  [de-identified] : Bilateral breasts soft with resolving ecchymosis and swelling,  B/l nipples viable with intact sensation. [place] pattern incisions are CDI, no open areas or drainage. B/l T point prolene sutures in place. No erythema or cellulitis. R breast with tenderness lateral to NAC, no palpable fluid collection. Prominent lateral breast tissue R>L [de-identified] : soft, incision healing well, c/d/i, umbilicus viable slightly high, excellent overall contour, skin laxity [de-identified] : prominent mons with skin laxity  [de-identified] : excess skin of inner arms with bat-wing deformity  [de-identified] : no edema or calf tenderness

## 2024-09-23 ENCOUNTER — APPOINTMENT (OUTPATIENT)
Dept: NEUROLOGY | Facility: CLINIC | Age: 59
End: 2024-09-23
Payer: MEDICARE

## 2024-09-23 PROCEDURE — 95816 EEG AWAKE AND DROWSY: CPT

## 2024-10-02 ENCOUNTER — APPOINTMENT (OUTPATIENT)
Dept: NEUROLOGY | Facility: CLINIC | Age: 59
End: 2024-10-02
Payer: MEDICARE

## 2024-10-02 DIAGNOSIS — R41.89 OTHER SYMPTOMS AND SIGNS INVOLVING COGNITIVE FUNCTIONS AND AWARENESS: ICD-10-CM

## 2024-10-02 PROCEDURE — 99442: CPT | Mod: 93

## 2024-10-02 RX ORDER — LAMOTRIGINE 300 MG/1
300 TABLET, EXTENDED RELEASE ORAL DAILY
Qty: 180 | Refills: 3 | Status: ACTIVE | COMMUNITY
Start: 2024-10-02 | End: 1900-01-01

## 2024-10-08 ENCOUNTER — APPOINTMENT (OUTPATIENT)
Dept: PLASTIC SURGERY | Facility: CLINIC | Age: 59
End: 2024-10-08
Payer: MEDICARE

## 2024-10-08 VITALS — HEIGHT: 62 IN | WEIGHT: 180 LBS | BODY MASS INDEX: 33.13 KG/M2

## 2024-10-08 PROCEDURE — 99212 OFFICE O/P EST SF 10 MIN: CPT

## 2024-11-18 NOTE — BRIEF OPERATIVE NOTE - CONDITION POST OP
EMS from [facility]- unwitnessed fall with left shoulder pain. Pt is on Eliquis for DVT in leg. Pt arrives AO x 2-3 stating that she did not hit head and has zero complaints. Pt denies left shoulder pain.    stable, extubated

## 2024-11-25 ENCOUNTER — OUTPATIENT (OUTPATIENT)
Dept: OUTPATIENT SERVICES | Facility: HOSPITAL | Age: 59
LOS: 1 days | End: 2024-11-25
Payer: MEDICARE

## 2024-11-25 VITALS
WEIGHT: 171.96 LBS | RESPIRATION RATE: 17 BRPM | HEART RATE: 54 BPM | SYSTOLIC BLOOD PRESSURE: 124 MMHG | DIASTOLIC BLOOD PRESSURE: 78 MMHG | OXYGEN SATURATION: 99 % | HEIGHT: 63 IN | TEMPERATURE: 97 F

## 2024-11-25 DIAGNOSIS — Z98.891 HISTORY OF UTERINE SCAR FROM PREVIOUS SURGERY: Chronic | ICD-10-CM

## 2024-11-25 DIAGNOSIS — Z98.890 OTHER SPECIFIED POSTPROCEDURAL STATES: Chronic | ICD-10-CM

## 2024-11-25 DIAGNOSIS — Z01.818 ENCOUNTER FOR OTHER PREPROCEDURAL EXAMINATION: ICD-10-CM

## 2024-11-25 DIAGNOSIS — Z87.19 PERSONAL HISTORY OF OTHER DISEASES OF THE DIGESTIVE SYSTEM: Chronic | ICD-10-CM

## 2024-11-25 DIAGNOSIS — L98.7 EXCESSIVE AND REDUNDANT SKIN AND SUBCUTANEOUS TISSUE: ICD-10-CM

## 2024-11-25 DIAGNOSIS — Z90.89 ACQUIRED ABSENCE OF OTHER ORGANS: Chronic | ICD-10-CM

## 2024-11-25 DIAGNOSIS — Z86.018 PERSONAL HISTORY OF OTHER BENIGN NEOPLASM: Chronic | ICD-10-CM

## 2024-11-25 LAB
ALBUMIN SERPL ELPH-MCNC: 4.3 G/DL — SIGNIFICANT CHANGE UP (ref 3.5–5.2)
ALP SERPL-CCNC: 172 U/L — HIGH (ref 30–115)
ALT FLD-CCNC: 92 U/L — HIGH (ref 0–41)
ANION GAP SERPL CALC-SCNC: 11 MMOL/L — SIGNIFICANT CHANGE UP (ref 7–14)
AST SERPL-CCNC: 69 U/L — HIGH (ref 0–41)
BASOPHILS # BLD AUTO: 0.04 K/UL — SIGNIFICANT CHANGE UP (ref 0–0.2)
BASOPHILS NFR BLD AUTO: 0.9 % — SIGNIFICANT CHANGE UP (ref 0–1)
BILIRUB SERPL-MCNC: 0.4 MG/DL — SIGNIFICANT CHANGE UP (ref 0.2–1.2)
BUN SERPL-MCNC: 17 MG/DL — SIGNIFICANT CHANGE UP (ref 10–20)
CALCIUM SERPL-MCNC: 9.3 MG/DL — SIGNIFICANT CHANGE UP (ref 8.4–10.5)
CHLORIDE SERPL-SCNC: 107 MMOL/L — SIGNIFICANT CHANGE UP (ref 98–110)
CO2 SERPL-SCNC: 24 MMOL/L — SIGNIFICANT CHANGE UP (ref 17–32)
CREAT SERPL-MCNC: 0.8 MG/DL — SIGNIFICANT CHANGE UP (ref 0.7–1.5)
EGFR: 84 ML/MIN/1.73M2 — SIGNIFICANT CHANGE UP
EOSINOPHIL # BLD AUTO: 0.1 K/UL — SIGNIFICANT CHANGE UP (ref 0–0.7)
EOSINOPHIL NFR BLD AUTO: 2.3 % — SIGNIFICANT CHANGE UP (ref 0–8)
GLUCOSE SERPL-MCNC: 77 MG/DL — SIGNIFICANT CHANGE UP (ref 70–99)
HCT VFR BLD CALC: 42.6 % — SIGNIFICANT CHANGE UP (ref 37–47)
HGB BLD-MCNC: 14.7 G/DL — SIGNIFICANT CHANGE UP (ref 12–16)
IMM GRANULOCYTES NFR BLD AUTO: 0.2 % — SIGNIFICANT CHANGE UP (ref 0.1–0.3)
LYMPHOCYTES # BLD AUTO: 1.76 K/UL — SIGNIFICANT CHANGE UP (ref 1.2–3.4)
LYMPHOCYTES # BLD AUTO: 39.9 % — SIGNIFICANT CHANGE UP (ref 20.5–51.1)
MCHC RBC-ENTMCNC: 30.2 PG — SIGNIFICANT CHANGE UP (ref 27–31)
MCHC RBC-ENTMCNC: 34.5 G/DL — SIGNIFICANT CHANGE UP (ref 32–37)
MCV RBC AUTO: 87.7 FL — SIGNIFICANT CHANGE UP (ref 81–99)
MONOCYTES # BLD AUTO: 0.32 K/UL — SIGNIFICANT CHANGE UP (ref 0.1–0.6)
MONOCYTES NFR BLD AUTO: 7.3 % — SIGNIFICANT CHANGE UP (ref 1.7–9.3)
NEUTROPHILS # BLD AUTO: 2.18 K/UL — SIGNIFICANT CHANGE UP (ref 1.4–6.5)
NEUTROPHILS NFR BLD AUTO: 49.4 % — SIGNIFICANT CHANGE UP (ref 42.2–75.2)
NRBC # BLD: 0 /100 WBCS — SIGNIFICANT CHANGE UP (ref 0–0)
PLATELET # BLD AUTO: 259 K/UL — SIGNIFICANT CHANGE UP (ref 130–400)
PMV BLD: 9.8 FL — SIGNIFICANT CHANGE UP (ref 7.4–10.4)
POTASSIUM SERPL-MCNC: 4.3 MMOL/L — SIGNIFICANT CHANGE UP (ref 3.5–5)
POTASSIUM SERPL-SCNC: 4.3 MMOL/L — SIGNIFICANT CHANGE UP (ref 3.5–5)
PROT SERPL-MCNC: 6.4 G/DL — SIGNIFICANT CHANGE UP (ref 6–8)
RBC # BLD: 4.86 M/UL — SIGNIFICANT CHANGE UP (ref 4.2–5.4)
RBC # FLD: 16.3 % — HIGH (ref 11.5–14.5)
SODIUM SERPL-SCNC: 142 MMOL/L — SIGNIFICANT CHANGE UP (ref 135–146)
WBC # BLD: 4.41 K/UL — LOW (ref 4.8–10.8)
WBC # FLD AUTO: 4.41 K/UL — LOW (ref 4.8–10.8)

## 2024-11-25 PROCEDURE — 85025 COMPLETE CBC W/AUTO DIFF WBC: CPT

## 2024-11-25 PROCEDURE — 80053 COMPREHEN METABOLIC PANEL: CPT

## 2024-11-25 PROCEDURE — 93005 ELECTROCARDIOGRAM TRACING: CPT

## 2024-11-25 PROCEDURE — 36415 COLL VENOUS BLD VENIPUNCTURE: CPT

## 2024-11-25 PROCEDURE — 93010 ELECTROCARDIOGRAM REPORT: CPT

## 2024-11-25 PROCEDURE — 99214 OFFICE O/P EST MOD 30 MIN: CPT | Mod: 25

## 2024-11-25 NOTE — H&P PST ADULT - PRIMARY CARE PROVIDER
pmd: allen(lv 10/24)     cardio:obyrne(lv 9/24) Nsaids Pregnancy And Lactation Text: These medications are considered safe up to 30 weeks gestation. It is excreted in breast milk.

## 2024-11-25 NOTE — H&P PST ADULT - NSICDXPASTSURGICALHX_GEN_ALL_CORE_FT
PAST SURGICAL HISTORY:  H/O breast surgery     H/O hiatal hernia     H/O:      History of gastric surgery     History of lipoma     History of tonsillectomy     S/P panniculectomy

## 2024-11-25 NOTE — H&P PST ADULT - HISTORY OF PRESENT ILLNESS
PATIENT DENIES CHEST PAIN, SHORTNESS OF BREATH, PALPITATIONS, COUGHING, FEVER, DYSURIA.    NO COUGH, FEVER, SORE THROAT, HEADACHE, LOSS OF TASTE OR SMELL. NO KNOWN EXPOSURE TO ANYONE WITH COVID. PATIENT WAS INSTRUCTED TO ISOLATE FROM NOW UNTIL THE SURGERY.    Anesthesia Alert  NO--Difficult Airway  NO--History of neck surgery or radiation  NO--Limited ROM of neck  NO--History of Malignant hyperthermia  NO--Personal or family history of Pseudocholinesterase deficiency  + Prior Anesthesia Complication (LAST SURGERY SHE GOT A CORNEAL ABRASION, AS HER EYES WEREN'T COVERED OR LUBRICATED PROPERLY PER PT.)  NO--Latex Allergy  NO--Loose teeth  NO--History of Rheumatoid Arthritis  NO--VY  NO-BLEEDING RISK    dasi=0  rcri=7.25 mets (only limited to pain)    As per patient, this is their complete medical and surgical history, including medications both prescribed or over the counter.  Patient verbalized understanding of instructions and was given the opportunity to ask questions and have them answered.

## 2024-11-25 NOTE — H&P PST ADULT - REASON FOR ADMISSION
58 Y/O FEMALE HERE FOR PRE-ADMISSION SURGICAL TESTING. H/O SEIZURES (LAST ONE MANY YRS AGO), HTN, HLD, ANXIETY, DEPRESSION AND FIBROMYALGIA. PATIENT REPORTS SHE LOST 160 LBS S/P LAP BAND, RYGB AND MOUNJARO. SHE HAS EXCESSIVE SKIN TO MONS AND LOWER ABDOMEN. SHE HAD A PANNICULECTOMY IN FEBRUARY. STATES SHE NEEDS MORE SKIN REMOVED.   NOW FOR SCHEDULED MONS LIFT, SECOND STAGE PANNICULECTOMY.

## 2024-11-25 NOTE — H&P PST ADULT - NSANTHOSAYNRD_GEN_A_CORE
denies/No. VY screening performed.  STOP BANG Legend: 0-2 = LOW Risk; 3-4 = INTERMEDIATE Risk; 5-8 = HIGH Risk

## 2024-11-26 DIAGNOSIS — Z01.818 ENCOUNTER FOR OTHER PREPROCEDURAL EXAMINATION: ICD-10-CM

## 2024-11-26 DIAGNOSIS — L98.7 EXCESSIVE AND REDUNDANT SKIN AND SUBCUTANEOUS TISSUE: ICD-10-CM

## 2024-12-05 ENCOUNTER — APPOINTMENT (OUTPATIENT)
Dept: PLASTIC SURGERY | Facility: CLINIC | Age: 59
End: 2024-12-05
Payer: MEDICARE

## 2024-12-05 PROCEDURE — G2211 COMPLEX E/M VISIT ADD ON: CPT

## 2024-12-05 PROCEDURE — 99212 OFFICE O/P EST SF 10 MIN: CPT

## 2024-12-09 ENCOUNTER — OUTPATIENT (OUTPATIENT)
Dept: OUTPATIENT SERVICES | Facility: HOSPITAL | Age: 59
LOS: 1 days | End: 2024-12-09
Payer: MEDICARE

## 2024-12-09 DIAGNOSIS — Z98.890 OTHER SPECIFIED POSTPROCEDURAL STATES: Chronic | ICD-10-CM

## 2024-12-09 DIAGNOSIS — Z90.89 ACQUIRED ABSENCE OF OTHER ORGANS: Chronic | ICD-10-CM

## 2024-12-09 DIAGNOSIS — Z86.018 PERSONAL HISTORY OF OTHER BENIGN NEOPLASM: Chronic | ICD-10-CM

## 2024-12-09 DIAGNOSIS — M79.2 NEURALGIA AND NEURITIS, UNSPECIFIED: ICD-10-CM

## 2024-12-09 DIAGNOSIS — Z98.891 HISTORY OF UTERINE SCAR FROM PREVIOUS SURGERY: Chronic | ICD-10-CM

## 2024-12-09 DIAGNOSIS — J01.90 ACUTE SINUSITIS, UNSPECIFIED: ICD-10-CM

## 2024-12-09 DIAGNOSIS — Z87.19 PERSONAL HISTORY OF OTHER DISEASES OF THE DIGESTIVE SYSTEM: Chronic | ICD-10-CM

## 2024-12-09 PROCEDURE — 70210 X-RAY EXAM OF SINUSES: CPT | Mod: 26

## 2024-12-09 PROCEDURE — 70210 X-RAY EXAM OF SINUSES: CPT

## 2024-12-09 RX ORDER — GABAPENTIN 300 MG/1
300 CAPSULE ORAL
Qty: 7 | Refills: 0 | Status: ACTIVE | COMMUNITY
Start: 2024-12-09 | End: 1900-01-01

## 2024-12-10 DIAGNOSIS — J01.90 ACUTE SINUSITIS, UNSPECIFIED: ICD-10-CM

## 2024-12-16 ENCOUNTER — OUTPATIENT (OUTPATIENT)
Dept: OUTPATIENT SERVICES | Facility: HOSPITAL | Age: 59
LOS: 1 days | Discharge: HOME | End: 2024-12-16
Payer: MEDICARE

## 2024-12-16 ENCOUNTER — APPOINTMENT (OUTPATIENT)
Dept: PLASTIC SURGERY | Facility: AMBULATORY SURGERY CENTER | Age: 59
End: 2024-12-16
Payer: MEDICARE

## 2024-12-16 ENCOUNTER — RESULT REVIEW (OUTPATIENT)
Age: 59
End: 2024-12-16

## 2024-12-16 ENCOUNTER — TRANSCRIPTION ENCOUNTER (OUTPATIENT)
Age: 59
End: 2024-12-16

## 2024-12-16 VITALS
RESPIRATION RATE: 21 BRPM | HEART RATE: 83 BPM | OXYGEN SATURATION: 99 % | DIASTOLIC BLOOD PRESSURE: 55 MMHG | SYSTOLIC BLOOD PRESSURE: 110 MMHG

## 2024-12-16 VITALS
WEIGHT: 171.96 LBS | HEIGHT: 63 IN | HEART RATE: 55 BPM | DIASTOLIC BLOOD PRESSURE: 69 MMHG | RESPIRATION RATE: 18 BRPM | OXYGEN SATURATION: 100 % | SYSTOLIC BLOOD PRESSURE: 117 MMHG | TEMPERATURE: 98 F

## 2024-12-16 DIAGNOSIS — Z98.891 HISTORY OF UTERINE SCAR FROM PREVIOUS SURGERY: Chronic | ICD-10-CM

## 2024-12-16 DIAGNOSIS — L98.7 EXCESSIVE AND REDUNDANT SKIN AND SUBCUTANEOUS TISSUE: ICD-10-CM

## 2024-12-16 DIAGNOSIS — Z87.19 PERSONAL HISTORY OF OTHER DISEASES OF THE DIGESTIVE SYSTEM: Chronic | ICD-10-CM

## 2024-12-16 DIAGNOSIS — Z90.89 ACQUIRED ABSENCE OF OTHER ORGANS: Chronic | ICD-10-CM

## 2024-12-16 DIAGNOSIS — Z86.018 PERSONAL HISTORY OF OTHER BENIGN NEOPLASM: Chronic | ICD-10-CM

## 2024-12-16 DIAGNOSIS — Z98.890 OTHER SPECIFIED POSTPROCEDURAL STATES: Chronic | ICD-10-CM

## 2024-12-16 DIAGNOSIS — G89.18 OTHER ACUTE POSTPROCEDURAL PAIN: ICD-10-CM

## 2024-12-16 PROCEDURE — 15839 EXC EXCESSIVE SKN OTHER AREA: CPT

## 2024-12-16 PROCEDURE — 88302 TISSUE EXAM BY PATHOLOGIST: CPT | Mod: 26

## 2024-12-16 PROCEDURE — 15830 EXC EXCESSIVE SKIN ABDOMEN: CPT

## 2024-12-16 PROCEDURE — 88302 TISSUE EXAM BY PATHOLOGIST: CPT

## 2024-12-16 PROCEDURE — C9399: CPT

## 2024-12-16 RX ORDER — 0.9 % SODIUM CHLORIDE 0.9 %
1000 INTRAVENOUS SOLUTION INTRAVENOUS
Refills: 0 | Status: DISCONTINUED | OUTPATIENT
Start: 2024-12-16 | End: 2024-12-16

## 2024-12-16 RX ORDER — DOXYCYCLINE HYCLATE 150 MG/1
1 TABLET, COATED ORAL
Qty: 14 | Refills: 0
Start: 2024-12-16 | End: 2024-12-22

## 2024-12-16 RX ORDER — ONDANSETRON HYDROCHLORIDE 4 MG/1
4 TABLET, FILM COATED ORAL ONCE
Refills: 0 | Status: DISCONTINUED | OUTPATIENT
Start: 2024-12-16 | End: 2024-12-16

## 2024-12-16 RX ORDER — ONDANSETRON HYDROCHLORIDE 4 MG/1
1 TABLET, FILM COATED ORAL
Qty: 3 | Refills: 0
Start: 2024-12-16

## 2024-12-16 RX ORDER — LAMOTRIGINE 50 MG/1
2 TABLET, EXTENDED RELEASE ORAL
Refills: 0 | DISCHARGE

## 2024-12-16 RX ORDER — TRAMADOL HYDROCHLORIDE 50 MG/1
50 TABLET, COATED ORAL
Qty: 15 | Refills: 0 | Status: ACTIVE | COMMUNITY
Start: 2024-12-16 | End: 1900-01-01

## 2024-12-16 NOTE — ASU DISCHARGE PLAN (ADULT/PEDIATRIC) - RETURN TO WORK/SCHOOL
Alert-The patient is alert, awake and responds to voice. The patient is oriented to time, place, and person. The triage nurse is able to obtain subjective information.
No...

## 2024-12-16 NOTE — ASU DISCHARGE PLAN (ADULT/PEDIATRIC) - ASU DC SPECIAL INSTRUCTIONSFT
Keep surgical site clean and dry. Do not get wet.  Do not remove dressings.   Empty and record drain 2x daily.  Keep drain bulb to suction.   Sleep in beach chair position.   No heavy lifting, only rest.   No showers or baths, sponge bathe only.

## 2024-12-16 NOTE — ASU DISCHARGE PLAN (ADULT/PEDIATRIC) - MEDICATION INSTRUCTIONS
Take extra strength Tylenol every 6 hours, take Gabapentin twice daily. Take Tramadol every 6 hours as needed for severe pain.  Take Doxycycline as directed x 7 days.

## 2024-12-16 NOTE — BRIEF OPERATIVE NOTE - BRIEF OP NOTE DRAINS
19 Egyptian abby  Same Histology In Subsequent Stages Text: The pattern and morphology of the tumor is as described in the first stage.

## 2024-12-16 NOTE — ASU DISCHARGE PLAN (ADULT/PEDIATRIC) - CARE PROVIDER_API CALL
Romario Gilliland  Plastic Surgery  [address] [place] [id number]  Phone: [phone]  Fax: [phone]  Follow Up Time: 1 week

## 2024-12-16 NOTE — ASU DISCHARGE PLAN (ADULT/PEDIATRIC) - FINANCIAL ASSISTANCE
[facility] provides services at a reduced cost to those who are determined to be eligible through [facility]’s financial assistance program. Information regarding [facility]’s financial assistance program can be found by going to [email or web address] or by calling [phone].

## 2024-12-16 NOTE — CHART NOTE - NSCHARTNOTEFT_GEN_A_CORE
PACU ANESTHESIA ADMISSION NOTE      Procedure:   Post op diagnosis:      ____  Intubated  TV:______       Rate: ______      FiO2: ______    __x__  Patent Airway    __x__  Full return of protective reflexes    __x__  Full recovery from anesthesia / back to baseline status    Vitals  HR: 88  BP: 115/68  RR: 15  O2 Sat: 98%  Temp: 97.5    Mental Status:  ____ Awake   _____ Alert   __x___ Drowsy   _____ Sedated    Nausea/Vomiting:  __x__ NO  ______Yes,   See Post - Op Orders          Pain Scale (0-10):  _____    Treatment: ____ None    __x__ See Post - Op/PCA Orders    Post - Operative Fluids:   ____ Oral   __x__ See Post - Op Orders    Plan: Discharge when criteria met:   __x__Home       _____Floor     _____Critical Care   Other:_________________    Comments: Patient had smooth intraoperative event, no anesthesia complication.

## 2024-12-17 ENCOUNTER — NON-APPOINTMENT (OUTPATIENT)
Age: 59
End: 2024-12-17

## 2024-12-19 DIAGNOSIS — L98.7 EXCESSIVE AND REDUNDANT SKIN AND SUBCUTANEOUS TISSUE: ICD-10-CM

## 2024-12-19 DIAGNOSIS — E78.5 HYPERLIPIDEMIA, UNSPECIFIED: ICD-10-CM

## 2024-12-19 DIAGNOSIS — M79.7 FIBROMYALGIA: ICD-10-CM

## 2024-12-19 DIAGNOSIS — Z98.84 BARIATRIC SURGERY STATUS: ICD-10-CM

## 2024-12-19 DIAGNOSIS — I10 ESSENTIAL (PRIMARY) HYPERTENSION: ICD-10-CM

## 2024-12-19 DIAGNOSIS — G40.909 EPILEPSY, UNSPECIFIED, NOT INTRACTABLE, WITHOUT STATUS EPILEPTICUS: ICD-10-CM

## 2024-12-19 DIAGNOSIS — Z87.891 PERSONAL HISTORY OF NICOTINE DEPENDENCE: ICD-10-CM

## 2024-12-19 DIAGNOSIS — F32.A DEPRESSION, UNSPECIFIED: ICD-10-CM

## 2024-12-23 ENCOUNTER — APPOINTMENT (OUTPATIENT)
Dept: PLASTIC SURGERY | Facility: CLINIC | Age: 59
End: 2024-12-23
Payer: MEDICARE

## 2024-12-23 ENCOUNTER — NON-APPOINTMENT (OUTPATIENT)
Age: 59
End: 2024-12-23

## 2024-12-23 DIAGNOSIS — Z87.891 PERSONAL HISTORY OF NICOTINE DEPENDENCE: ICD-10-CM

## 2024-12-23 PROBLEM — Z98.890 S/P PANNICULECTOMY: Status: ACTIVE | Noted: 2024-12-23

## 2024-12-23 PROCEDURE — 99024 POSTOP FOLLOW-UP VISIT: CPT

## 2024-12-28 LAB — SURGICAL PATHOLOGY STUDY: SIGNIFICANT CHANGE UP

## 2024-12-30 ENCOUNTER — APPOINTMENT (OUTPATIENT)
Dept: PLASTIC SURGERY | Facility: CLINIC | Age: 59
End: 2024-12-30
Payer: MEDICARE

## 2024-12-30 DIAGNOSIS — Z98.890 OTHER SPECIFIED POSTPROCEDURAL STATES: ICD-10-CM

## 2024-12-30 PROCEDURE — 99024 POSTOP FOLLOW-UP VISIT: CPT

## 2025-01-30 ENCOUNTER — APPOINTMENT (OUTPATIENT)
Dept: PLASTIC SURGERY | Facility: CLINIC | Age: 60
End: 2025-01-30
Payer: MEDICARE

## 2025-01-30 DIAGNOSIS — M79.3 PANNICULITIS, UNSPECIFIED: ICD-10-CM

## 2025-01-30 DIAGNOSIS — Z98.890 OTHER SPECIFIED POSTPROCEDURAL STATES: ICD-10-CM

## 2025-01-30 PROCEDURE — 99024 POSTOP FOLLOW-UP VISIT: CPT

## 2025-02-27 ENCOUNTER — RX RENEWAL (OUTPATIENT)
Age: 60
End: 2025-02-27

## 2025-04-07 ENCOUNTER — APPOINTMENT (OUTPATIENT)
Dept: PLASTIC SURGERY | Facility: CLINIC | Age: 60
End: 2025-04-07
Payer: MEDICARE

## 2025-04-07 PROCEDURE — 99214 OFFICE O/P EST MOD 30 MIN: CPT

## 2025-05-01 ENCOUNTER — APPOINTMENT (OUTPATIENT)
Dept: NEUROLOGY | Facility: CLINIC | Age: 60
End: 2025-05-01

## 2025-06-03 ENCOUNTER — RX RENEWAL (OUTPATIENT)
Age: 60
End: 2025-06-03